# Patient Record
Sex: MALE | Race: WHITE | NOT HISPANIC OR LATINO | URBAN - METROPOLITAN AREA
[De-identification: names, ages, dates, MRNs, and addresses within clinical notes are randomized per-mention and may not be internally consistent; named-entity substitution may affect disease eponyms.]

---

## 2020-01-08 ENCOUNTER — OUTPATIENT (OUTPATIENT)
Dept: OUTPATIENT SERVICES | Facility: HOSPITAL | Age: 80
LOS: 1 days | End: 2020-01-08

## 2021-10-05 PROBLEM — Z00.00 ENCOUNTER FOR PREVENTIVE HEALTH EXAMINATION: Status: ACTIVE | Noted: 2021-10-05

## 2021-10-08 ENCOUNTER — NON-APPOINTMENT (OUTPATIENT)
Age: 81
End: 2021-10-08

## 2021-10-08 ENCOUNTER — APPOINTMENT (OUTPATIENT)
Dept: CARDIOLOGY | Facility: CLINIC | Age: 81
End: 2021-10-08
Payer: MEDICARE

## 2021-10-08 VITALS
HEIGHT: 72 IN | WEIGHT: 235 LBS | TEMPERATURE: 97 F | OXYGEN SATURATION: 97 % | DIASTOLIC BLOOD PRESSURE: 60 MMHG | HEART RATE: 95 BPM | BODY MASS INDEX: 31.83 KG/M2 | SYSTOLIC BLOOD PRESSURE: 138 MMHG

## 2021-10-08 DIAGNOSIS — Z78.9 OTHER SPECIFIED HEALTH STATUS: ICD-10-CM

## 2021-10-08 PROCEDURE — 93242 EXT ECG>48HR<7D RECORDING: CPT

## 2021-10-08 PROCEDURE — 99214 OFFICE O/P EST MOD 30 MIN: CPT

## 2021-10-08 PROCEDURE — 93000 ELECTROCARDIOGRAM COMPLETE: CPT | Mod: 59

## 2021-10-08 RX ORDER — TAMSULOSIN HYDROCHLORIDE 0.4 MG/1
0.4 CAPSULE ORAL
Refills: 0 | Status: ACTIVE | COMMUNITY

## 2021-10-08 RX ORDER — MULTIVIT-MINS/IRON/FOLIC/LYCOP 8-200-600
TABLET ORAL
Refills: 0 | Status: ACTIVE | COMMUNITY

## 2021-10-08 RX ORDER — FINASTERIDE 5 MG/1
5 TABLET, FILM COATED ORAL DAILY
Refills: 0 | Status: ACTIVE | COMMUNITY

## 2021-10-08 NOTE — ADDENDUM
[FreeTextEntry1] : Please note the patient was reviewed with the NP.\par I was physically present during the service of the patient\par I was directly involved in the management plan and recommendations of care provided to the patient. \par I personally reviewed the history and physical exam and plan as documented by the NP above.\par \par Samuel Vizcarra DO, FACC, RPVI\par Cardiologist\par 10/8/2021

## 2021-10-08 NOTE — ASSESSMENT
[FreeTextEntry1] : SUJIT JIMENEZ is a 80 year old M who presents today Oct 08, 2021 with new onset AF. \par \par 1) AF: Asymptomatic. \par Educated patient on pathophysiology of atrial fibrillation and natural progression as well as associated risk of cardioembolic event. Informed him on indications for long term anticoagulation. Currently there is no unusual bleeding on NOAC, continue current dose. Reviewed bleeding precautions. \par Rate control vs rhythm control discussed. Prefer rhythm control with RAMYA/DCCV. Discussed risks, benefits, and alternatives. Pt wishes to start with medical therapy for rate control and will come back with wife to discuss further. Increase toprol 100mg BID. 3 day monitor placed. \par \par 2) HTN: Borderline. Cont norvasc 5mg daily. Cont toprol with above changes. Low salt diet. \par \par 3) HLD: Cont pravastatin managed by PCP. \par \par Eventual stress test once rate control has been established. Will see him back next week. \par Any questions and concerns were addressed and resolved.  \par \par Sincerely,\par \par TIM Hammond\par Patients history, testing, and plan reviewed with supervising MD: Dr. Samuel Vizcarra

## 2021-10-08 NOTE — HISTORY OF PRESENT ILLNESS
[FreeTextEntry1] : 80 M with PMH of HTN, HLD here today for new onset AF. Referred by Dr. Chase PCP. \par He was at [place] for elective finger surgery. His preop EKG showed SB. Day of surgery he had AF with RVR. Surgery was deferred. Asx so sent for outpatient followup. Pt saw PCP the following day who increased beta blocker to toprol 100mg and added eliquis 5mg BID. He remains asymptomatic with RVR on EKG today. \par \par He is active in general, works on the golf course and works in the yard. Denies exertional chest pain or discomfort. Denies unusual shortness of breath, orthopnea, weight gain, or LE edema. Denies palpitations, lightheadedness, dizziness, or syncope. \par \par There is no prior history of myocardial infarction, coronary revascularization, history of ischemic heart disease, or symptomatic congestive heart failure. There is no history of cerebrovascular events. \par

## 2021-10-08 NOTE — PHYSICAL EXAM
[No Murmur] : no murmur [Soft] : abdomen soft [Normal Gait] : normal gait [No Edema] : no edema [Normal] : alert and oriented, normal memory [de-identified] : irreg irreg

## 2021-10-13 ENCOUNTER — APPOINTMENT (OUTPATIENT)
Dept: CARDIOLOGY | Facility: CLINIC | Age: 81
End: 2021-10-13
Payer: MEDICARE

## 2021-10-13 VITALS
HEART RATE: 107 BPM | SYSTOLIC BLOOD PRESSURE: 122 MMHG | WEIGHT: 239 LBS | BODY MASS INDEX: 32.37 KG/M2 | OXYGEN SATURATION: 97 % | TEMPERATURE: 97.1 F | DIASTOLIC BLOOD PRESSURE: 76 MMHG | HEIGHT: 72 IN

## 2021-10-13 PROCEDURE — 99214 OFFICE O/P EST MOD 30 MIN: CPT

## 2021-10-13 NOTE — HISTORY OF PRESENT ILLNESS
[FreeTextEntry1] : 80 M with PMH of HTN, HLD here today for new onset AF. Referred by Dr. Chase PCP. \par He was at [place] for elective finger surgery. His preop EKG showed SB. Day of surgery he had AF with RVR. Surgery was deferred. Asx so sent for outpatient followup. Pt saw PCP the following day who increased beta blocker to toprol 100mg and added eliquis 5mg BID. He remains asymptomatic with RVR on EKG. \par \par He is active in general, works on the golf course and works in the yard. Denies exertional chest pain or discomfort. Denies unusual shortness of breath, orthopnea, weight gain, or LE edema. Denies palpitations, lightheadedness, dizziness, or syncope. \par \par There is no prior history of myocardial infarction, coronary revascularization, history of ischemic heart disease, or symptomatic congestive heart failure. There is no history of cerebrovascular events. \par \par His initial consult was last week, his toprol was doubled to 100mg BID. He remains in -110s on exam in office today but remains asymptomatic as above. He is reluctant to pursue DCCV. He has no bleeding complications on AC. \par Labs were requested from PCP office to check TSH. He endorses rare snoring episodes, wife denies apneic eps. He rarely drink 1-2 drinks of ETOH. He has one cup of coffee in the morning. \par \par 3 day monitor results are not yet available. \par

## 2021-10-13 NOTE — ASSESSMENT
[FreeTextEntry1] : SUJIT JIMENEZ is a 80 year old M who presents today Oct 13, 2021 with new onset AF for close followup of rate control. \par \par 1) AF: Asymptomatic. \par Educated patient on pathophysiology of atrial fibrillation and natural progression as well as associated risk of cardioembolic event. Informed him on indications for long term anticoagulation. Currently there is no unusual bleeding on NOAC, continue current dose. Reviewed bleeding precautions. \par Rate control vs rhythm control discussed. Prefer rhythm control with RAMYA/DCCV. Discussed risks, benefits, and alternatives. Encouraged early rhythm control strategy, pt declines. Pt wishes to cont with medical therapy, toprol 100mg BID. 3 day monitor results not yet available but remains tachycardic today. \par - Will obtain echo to r/o structural abnormalities and assess LVEF. Consider change norvasc to diltiazem if stable findings seeing as his BP can tolerate. \par - Discussed need to r/o underlying etiologies of AF, requested labs for TSH from PCP office. Recommend a sleep study to r/o sleep apnea w/ PCP. Reduced caffeine and ETOH discussed. Weight loss. \par - If any unstable symptoms, chest pain >10 mins, dizziness, dyspnea at rest recommend emergent medical attn. \par \par 2) HTN: Improved. Cont norvasc 5mg daily, toprol 100mg BID.  Low salt diet. \par \par 3) HLD: Cont pravastatin managed by PCP. \par \par I'll call with monitor/echo results if med changes are warranted. \par Eventual stress test once rate control has been established. Close clinical followup is planned. \par Any questions and concerns by pt /wife were addressed and resolved.  \par \par Sincerely,\par \par TIM Hammond\par Patients history, testing, and plan reviewed with supervising MD: Dr. Claudio Dash

## 2021-10-13 NOTE — PHYSICAL EXAM
[No Murmur] : no murmur [Soft] : abdomen soft [Normal Gait] : normal gait [No Edema] : no edema [Normal] : alert and oriented, normal memory [de-identified] : irreg irreg

## 2021-10-20 ENCOUNTER — APPOINTMENT (OUTPATIENT)
Dept: CARDIOLOGY | Facility: CLINIC | Age: 81
End: 2021-10-20
Payer: MEDICARE

## 2021-10-20 PROCEDURE — 93306 TTE W/DOPPLER COMPLETE: CPT

## 2021-10-20 RX ORDER — AMLODIPINE BESYLATE 5 MG/1
5 TABLET ORAL DAILY
Refills: 0 | Status: DISCONTINUED | COMMUNITY
End: 2021-10-20

## 2021-10-22 PROCEDURE — 93244 EXT ECG>48HR<7D REV&INTERPJ: CPT

## 2021-10-28 ENCOUNTER — APPOINTMENT (OUTPATIENT)
Dept: CARDIOLOGY | Facility: CLINIC | Age: 81
End: 2021-10-28

## 2021-10-29 ENCOUNTER — APPOINTMENT (OUTPATIENT)
Dept: CARDIOLOGY | Facility: CLINIC | Age: 81
End: 2021-10-29
Payer: MEDICARE

## 2021-10-29 VITALS
HEART RATE: 75 BPM | HEIGHT: 72 IN | TEMPERATURE: 97.1 F | BODY MASS INDEX: 31.97 KG/M2 | OXYGEN SATURATION: 97 % | SYSTOLIC BLOOD PRESSURE: 130 MMHG | DIASTOLIC BLOOD PRESSURE: 68 MMHG | WEIGHT: 236 LBS

## 2021-10-29 PROCEDURE — 99215 OFFICE O/P EST HI 40 MIN: CPT

## 2021-11-01 NOTE — ASSESSMENT
[FreeTextEntry1] : SUJIT JIMENEZ  is a 80 year old  M\par rapid atrial fibrillation sx\par recommend rhythm control strategy for symptomatic atrial fibrillation \par plan for initial RAMYA guided cardioversion and subsequent electrophysiology evaluation regarding long-term management\par follow-up stress test due to ventricular ectopy versus aberrancy NSVT \par \par Educated patient on pathophysiology of atrial fibrillation and natural progression as well as associated risk of cardioembolic event. \par Informed him on indications for long term anticoagulation. Currently there is no unusual bleeding on NOAC, continue current dose. Reviewed bleeding precautions. \par \par HTN:  Low salt diet. \par HLD:  statin\par \par close clinical followup is planned. \par Any questions and concerns by pt /wife were addressed and resolved.  \par

## 2021-11-01 NOTE — HISTORY OF PRESENT ILLNESS
[FreeTextEntry1] : 80 M with PMH of HTN, HLD \par \par He was at [place] for elective finger surgery. \par initial EKG had sinus bradycardia \par Day of surgery he had AF with RVR. Surgery was deferred. \par subsequent EKG atrial fibrillation with rapid ventricular rate\par Pt saw PCP the following day who increased beta blocker to toprol 100mg and added eliquis 5mg BID. \par previously was on amlodipine losartan and metoprolol \par EKG in our office demonstrates rapid A. fib \par echocardiogram demonstrates normal left ventricular function mild mitral regurgitation mild to moderate aortic regurgitation mild ascending aortic dilatation mild pulmonary hypertension\par at times rapid ventricular rates \par amlodipine was changed to diltiazem\par \par There is no prior history of a clinical myocardial infarction, coronary revascularization. \par \par there is dyspnea on exertion and reduced exercise tolerance\par there is no shortness of breath at rest or orthopnea \par there is also new onset of peripheral edema \par There is no exertional chest pain, pressure or discomfort. \par There are no symptomatic palpitations, lightheadedness, dizziness or syncope.\par baseline active, works on the [employer] course and works in the yard.\par He has no bleeding complications on AC. \par \par Blood work September 2021 hemoglobin 15.7 potassium 4.1 creatinine 1.1 \par follow-up blood work with potassium 4.7 magnesium 2.1 TSH 0.7 \par monitor demonstrates atrial fibrillation with an average rate of 98 \par \par

## 2021-11-13 ENCOUNTER — APPOINTMENT (OUTPATIENT)
Dept: DISASTER EMERGENCY | Facility: CLINIC | Age: 81
End: 2021-11-13

## 2021-11-14 LAB — SARS-COV-2 N GENE NPH QL NAA+PROBE: NOT DETECTED

## 2021-11-17 ENCOUNTER — OUTPATIENT (OUTPATIENT)
Dept: INPATIENT UNIT | Facility: HOSPITAL | Age: 81
LOS: 1 days | End: 2021-11-17
Payer: MEDICARE

## 2021-11-17 PROCEDURE — 93010 ELECTROCARDIOGRAM REPORT: CPT | Mod: 59

## 2021-11-17 PROCEDURE — 92960 CARDIOVERSION ELECTRIC EXT: CPT

## 2021-11-17 PROCEDURE — 93312 ECHO TRANSESOPHAGEAL: CPT | Mod: 26

## 2021-11-23 ENCOUNTER — NON-APPOINTMENT (OUTPATIENT)
Age: 81
End: 2021-11-23

## 2021-11-23 ENCOUNTER — APPOINTMENT (OUTPATIENT)
Dept: CARDIOLOGY | Facility: CLINIC | Age: 81
End: 2021-11-23
Payer: MEDICARE

## 2021-11-23 VITALS
OXYGEN SATURATION: 98 % | HEART RATE: 63 BPM | WEIGHT: 230 LBS | DIASTOLIC BLOOD PRESSURE: 64 MMHG | TEMPERATURE: 98.2 F | BODY MASS INDEX: 31.15 KG/M2 | SYSTOLIC BLOOD PRESSURE: 140 MMHG | HEIGHT: 72 IN

## 2021-11-23 DIAGNOSIS — Z01.818 ENCOUNTER FOR OTHER PREPROCEDURAL EXAMINATION: ICD-10-CM

## 2021-11-23 DIAGNOSIS — Z87.09 PERSONAL HISTORY OF OTHER DISEASES OF THE RESPIRATORY SYSTEM: ICD-10-CM

## 2021-11-23 PROCEDURE — 93000 ELECTROCARDIOGRAM COMPLETE: CPT

## 2021-11-23 PROCEDURE — 99214 OFFICE O/P EST MOD 30 MIN: CPT

## 2021-11-23 RX ORDER — METOPROLOL SUCCINATE 100 MG/1
100 TABLET, EXTENDED RELEASE ORAL TWICE DAILY
Qty: 180 | Refills: 3 | Status: DISCONTINUED | COMMUNITY
End: 2021-11-23

## 2021-11-23 NOTE — HISTORY OF PRESENT ILLNESS
[FreeTextEntry1] : SUJIT JIMENEZ  is a 80 year old  M\par PMH of HTN, HLD, AFib\par \par He was at [place] for elective finger surgery. \par Day of surgery he had AF with RVR. Surgery was deferred. \par subsequent EKG atrial fibrillation with rapid ventricular rate\par Pt saw PCP the following day who increased beta blocker to toprol 100mg and added eliquis 5mg BID. \par previously was on amlodipine losartan and metoprolol \par EKG in our office demonstrates rapid A. fib \par \par at times rapid ventricular rates \par amlodipine was changed to diltiazem\par \par There is no prior history of a clinical myocardial infarction, coronary revascularization. \par \par He is active with golf and yard work. \par He has no bleeding complications on AC. \par There is no exertional chest pain, pressure or discomfort. \par There are no symptomatic palpitations, lightheadedness, dizziness or syncope.\par He reports improvement in symptoms p DCCV\par \par Blood work September 2021 hemoglobin 15.7 potassium 4.1 creatinine 1.1 \par follow-up blood work with potassium 4.7 magnesium 2.1 TSH 0.7 \par monitor demonstrates atrial fibrillation with an average rate of 98 \par Transesophageal echo has normal left ventricular function mild to moderate aortic and mitral regurgitation aortic atherosclerosis no thrombus \par direct-current cardioversion November 2021 restoration to normal sinus rhythm \par EKG demonstrates sinus rhythm with poor R wave progression \par echocardiogram demonstrates normal left ventricular function mild mitral regurgitation mild to moderate aortic regurgitation mild ascending aortic dilatation mild pulmonary hypertension

## 2021-11-23 NOTE — ASSESSMENT
[FreeTextEntry1] : HTN, AF, HL\par rapid atrial fibrillation sx s/ DCCV\par follow-up will be scheduled with electrophysiology regarding long-term rhythm control strategy\par \par Educated patient on pathophysiology of atrial fibrillation and natural progression as well as associated risk of cardioembolic event. \par Informed him on indications for long term anticoagulation. Currently there is no unusual bleeding on NOAC, continue current dose. Reviewed bleeding precautions. \par Continue statin therapy.\par \par HTN:  Low salt diet. \par HLD:  statin\par \par RAMYA reviewed \par normal left ventricular function. normal sinus rhythm restored \par \par EKG and echo reviewed\par normal sinus rhythm with poor R wave progression\par mild to moderate aortic regurgitation, mild ascending aortic dilatation, mild pulmonary hypertension, mild MR\par Instructed to follow-up will be scheduled with electrophysiology regarding long-term rhythm control strategy\par \par Chemical stress test requested to assess ischemia h/o NSVT multiple CRF etc\par \par F/u after test results \par \par Discussed red flag symptoms that would warrant immediate intervention. All patient questions and concerns have been addressed Instructed to call the office if any new symptoms.\par \par Encounter documented by Alena Justice on 11/23/2021  acting as a scribe for Dr. Lobito Nichols MD [facility]\par

## 2021-12-01 ENCOUNTER — APPOINTMENT (OUTPATIENT)
Dept: ELECTROPHYSIOLOGY | Facility: CLINIC | Age: 81
End: 2021-12-01
Payer: MEDICARE

## 2021-12-01 ENCOUNTER — NON-APPOINTMENT (OUTPATIENT)
Age: 81
End: 2021-12-01

## 2021-12-01 VITALS
BODY MASS INDEX: 31.42 KG/M2 | HEART RATE: 63 BPM | HEIGHT: 72 IN | OXYGEN SATURATION: 96 % | WEIGHT: 232 LBS | TEMPERATURE: 98 F | SYSTOLIC BLOOD PRESSURE: 148 MMHG | DIASTOLIC BLOOD PRESSURE: 60 MMHG

## 2021-12-01 PROCEDURE — 93000 ELECTROCARDIOGRAM COMPLETE: CPT

## 2021-12-01 PROCEDURE — 99204 OFFICE O/P NEW MOD 45 MIN: CPT

## 2021-12-01 NOTE — HISTORY OF PRESENT ILLNESS
[FreeTextEntry1] : Patient is an 80-year-old man who is seen in  evaluation for his atrial fibrillation.\par \par He has a prior history of hypertension and dyslipidemia.\par \par The patient was scheduled to have elective finger surgery  Nov 2021 at [facility] [place]  and on the day of surgery was noted to be in atrial fibrillation with increased ventricular response.  At that time he had no symptoms he had driven to the hospital and was feeling well without any awareness of atrial fibrillation.  He was seen by Dr. Gt Garcia and started on Toprol- mg/day and Eliquis 5 mg twice daily.  He was previously taking losartan, amlodipine and metoprolol for his blood pressure.  He subsequently saw Dr. Lobito Nichols and the patient underwent a RAMYA cardioversion.  In follow-up he remained in sinus rhythm.  The patient was not aware of A. fib but in retrospect after the cardioversion he is felt better with more energy.  He continues to feel well currently.\par \par The patient has been active until recently.  He walks the golf course currently without any symptoms.\par He had an echocardiogram performed on 10/20/2021 that showed EF 60%, left atrial diameter 4.9 cm, left atrial volume index 25 cc/m².  This question of atrial septal aneurysm with a  small PFO.  There is no pericardial effusion.  The septal thickness is 1.0 cm and posterior wall thickness 1.1 cm.\par \par A previous monitor from 10/8/2021 for 2 days had shown paroxysmal A. fib and 4 beats of an irregular tachycardia that appeared to be ventricular.\par Patient reports that his creatinine was previously elevated and he is following up with a nephrologist.

## 2021-12-01 NOTE — PHYSICAL EXAM
[No Acute Distress] : no acute distress [Normal Conjunctiva] : normal conjunctiva [Normal Venous Pressure] : normal venous pressure [No Carotid Bruit] : no carotid bruit [Normal S1, S2] : normal S1, S2 [Clear Lung Fields] : clear lung fields [Non Tender] : non-tender [No Focal Deficits] : no focal deficits [Alert and Oriented] : alert and oriented

## 2021-12-01 NOTE — REVIEW OF SYSTEMS
[Fever] : no fever [Weight Loss (___ Lbs)] : no recent weight loss [Blurry Vision] : no blurred vision [Sore Throat] : no sore throat [SOB] : no shortness of breath [Dyspnea on exertion] : not dyspnea during exertion [Chest Discomfort] : no chest discomfort [Leg Claudication] : no intermittent leg claudication [Palpitations] : no palpitations [Orthopnea] : no orthopnea [Cough] : no cough [Abdominal Pain] : no abdominal pain [Hematuria] : no hematuria [Joint Pain] : no joint pain [Rash] : no rash [Dizziness] : no dizziness [Under Stress] : not under stress [Easy Bleeding] : no tendency for easy bleeding [FreeTextEntry8] : elevated Cr

## 2021-12-01 NOTE — DISCUSSION/SUMMARY
[FreeTextEntry1] : The patient is previous paroxysmal A. fib noted in October and then persistent atrial fibrillation that required cardioversion.  When he was in A. fib he was not aware of it but now he feels the difference in sinus rhythm with much higher energy level.\par \par The triggers for his A. fib  include age as well as history of high blood pressure.  I agree with the management currently of beta-blocker therapy.  This may help to prevent future atrial fibrillation.  We will monitor his heart rate and if we should notice any significant bradycardia would recommend switching from diltiazem to Norvasc.\par \par The patient should continue on anticoagulation with Eliquis.\par \par \par \par In terms of risk factors for atrial fibrillation and modification: He has increased BMI recommend weight loss.  He denies any history of sleep apnea.  The patient occasionally snores according to spouse.  It may be reasonable for him to get a sleep study to rule out sleep apnea.  He  drinks alcohol socially small amount.\par \par We should do follow-up monitoring and if he has recurrent atrial fibrillation we could then consider catheter ablation versus antiarrhythmic therapy.\par  Regarding NSVT: 3-4 beats. Nl LV function. On beta blocker. Await stress test. \par We could do a random Zio patch monitor or consider an implantable loop monitor for follow-up monitoring.\par \par Plans\par He will decide on ILR vs External Monitoring\par Sleep Study ( wants to discuss with Dr. Chase first)\par Weight loss\par Stress test pending. \par Continue on Beta blocker and Eliquis\par Follow up with EP if recurrent AF\par

## 2021-12-09 ENCOUNTER — APPOINTMENT (OUTPATIENT)
Dept: CARDIOLOGY | Facility: CLINIC | Age: 81
End: 2021-12-09
Payer: MEDICARE

## 2021-12-09 PROCEDURE — A9502: CPT

## 2021-12-09 PROCEDURE — 78452 HT MUSCLE IMAGE SPECT MULT: CPT

## 2021-12-09 PROCEDURE — 93015 CV STRESS TEST SUPVJ I&R: CPT

## 2022-01-07 ENCOUNTER — APPOINTMENT (OUTPATIENT)
Dept: CARDIOLOGY | Facility: CLINIC | Age: 82
End: 2022-01-07

## 2022-01-14 ENCOUNTER — APPOINTMENT (OUTPATIENT)
Dept: CARDIOLOGY | Facility: CLINIC | Age: 82
End: 2022-01-14
Payer: MEDICARE

## 2022-01-14 ENCOUNTER — NON-APPOINTMENT (OUTPATIENT)
Age: 82
End: 2022-01-14

## 2022-01-14 VITALS
TEMPERATURE: 97.3 F | SYSTOLIC BLOOD PRESSURE: 114 MMHG | HEIGHT: 72 IN | DIASTOLIC BLOOD PRESSURE: 58 MMHG | BODY MASS INDEX: 31.83 KG/M2 | HEART RATE: 106 BPM | OXYGEN SATURATION: 97 % | WEIGHT: 235 LBS

## 2022-01-14 PROCEDURE — 99214 OFFICE O/P EST MOD 30 MIN: CPT

## 2022-01-14 PROCEDURE — 93000 ELECTROCARDIOGRAM COMPLETE: CPT

## 2022-01-14 PROCEDURE — 93242 EXT ECG>48HR<7D RECORDING: CPT

## 2022-01-14 NOTE — HISTORY OF PRESENT ILLNESS
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 \par \par Initially at [place] for elective finger surgery. \par Day of surgery had AF with RVR. Surgery was deferred. \par subsequent EKG atrial fibrillation with rapid ventricular rate\par Pt saw PCP the following day who increased beta blocker to toprol 100mg and added eliquis 5mg BID. \par previously was on amlodipine losartan and metoprolol \par EKG in our office demonstrates rapid A. fib \par amlodipine was changed to diltiazem\par \par improvement in symptoms s/p DCCV\par \par He is active with golf and yard work. \par no bleeding complications on AC. \par \par There is no exertional chest pain, pressure or discomfort. \par There are no symptomatic palpitations, lightheadedness, dizziness or syncope.\par \par Last EKG demonstrated sinus rhythm today's EKG demonstrates recurrent atrial fibrillation possible anteroseptal MI \par recent nuclear stress test December 2021 inferoapical defect with concomitant attenuation normal left ventricular function and no ischemia \par Blood work September 2021 hemoglobin 15.7 potassium 4.1 creatinine 1.1 \par monitor demonstrates atrial fibrillation with an average rate of 98 \par Transesophageal echo has normal left ventricular function mild to moderate aortic and mitral regurgitation aortic atherosclerosis no thrombus \par direct-current cardioversion November 2021 restoration to normal sinus rhythm \par echocardiogram demonstrates normal left ventricular function mild mitral regurgitation mild to moderate aortic regurgitation mild ascending aortic dilatation mild pulmonary hypertension\par

## 2022-01-14 NOTE — ASSESSMENT
[FreeTextEntry1] : \par rapid atrial fibrillation sx s/p DCCV\par recurrent atrial fibrillation \par monitor applied \par improvement in rate control \par refilled anticoagulation \par electrophysiology follow-up regarding long-term rhythm control strategy\par reviewed bleeding precautions. \par \par HTN:  Low salt diet. \par HLD:  statin\par \par mild to moderate aortic regurgitation, mild ascending aortic dilatation, mild pulmonary hypertension, mild MR\par \par Discussed red flag symptoms that would warrant immediate intervention. All patient questions and concerns have been addressed Instructed to call the office if any new symptoms.

## 2022-01-25 PROCEDURE — 93244 EXT ECG>48HR<7D REV&INTERPJ: CPT

## 2022-02-02 ENCOUNTER — APPOINTMENT (OUTPATIENT)
Dept: ELECTROPHYSIOLOGY | Facility: CLINIC | Age: 82
End: 2022-02-02
Payer: MEDICARE

## 2022-02-02 VITALS
TEMPERATURE: 97.7 F | DIASTOLIC BLOOD PRESSURE: 62 MMHG | OXYGEN SATURATION: 97 % | HEIGHT: 72 IN | BODY MASS INDEX: 32.23 KG/M2 | WEIGHT: 238 LBS | HEART RATE: 68 BPM | SYSTOLIC BLOOD PRESSURE: 148 MMHG

## 2022-02-02 PROCEDURE — 99214 OFFICE O/P EST MOD 30 MIN: CPT

## 2022-02-17 NOTE — HISTORY OF PRESENT ILLNESS
[FreeTextEntry1] : Patient seen in follow-up evaluation because of recurrent atrial fibrillation.  He is not symptomatic from the A. fib.  He denies any symptoms of shortness of breath, chest pain, dizziness, lightness, syncope or presyncope.  He had COVID 19 infection end of December 2021. A. fib recurred after Covid and he feels its related.\par \par Patient is currently on Eliquis 5 mg twice daily.  In addition he is rate controlled with diltiazem 120 mg daily and metoprolol 100 mg daily.\par \par \par He has a prior history of hypertension and dyslipidemia.\par \par Previous history: The patient [place] [facility] [place] on the day of surgery was noted to be in atrial fibrillation with increased ventricular response.  At that time he had no symptoms he had driven to the hospital and was feeling well without any awareness of atrial fibrillation.  He was seen by Dr. Gt Garcia and started on Toprol- mg/day and Eliquis 5 mg twice daily.  He was previously taking losartan, amlodipine and metoprolol for his blood pressure.  He subsequently saw Dr. Lobito Nichols and the patient underwent a RAMYA cardioversion.   The patient was not aware of A. fib but in retrospect after the cardioversion he is felt better with more energy.  \par The patient has been active.\par He had an echocardiogram performed on 10/20/2021 that showed EF 60%, left atrial diameter 4.9 cm, left atrial volume index 25 cc/m².  This question of atrial septal aneurysm with a  small PFO.  There is no pericardial effusion.  The septal thickness is 1.0 cm and posterior wall thickness 1.1 cm.\par \par A previous monitor from 10/8/2021 for 2 days had shown paroxysmal A. fib and 4 beats of an irregular tachycardia that appeared to be ventricular.\par Patient reports that his creatinine was previously elevated and he is following up with a nephrologist.

## 2022-02-17 NOTE — DISCUSSION/SUMMARY
[FreeTextEntry1] : He has recurrent atrial fibrillation that occurred after his Covid infection.  Question whether Covid infection serves as a trigger.  Discussed the treatment options and felt that it would be reasonable to restore sinus rhythm.  He did feel better after his prior cardioversion.  We discussed the possibility that the A. fib recurs.  We discussed antiarrhythmics versus catheter ablation.  He would prefer not to take further medications.  Patient would prefer to try cardioversion.  He was scheduled for electrical cardioversion and monitor how he feels afterwards.  Should he have recurrent A. fib we would then consider catheter ablation of A. fib.   I told him they might be like high likelihood of recurrent A. fib given the fact that he has had prior A. fib.\par His anticoagulation has been noninterrupted and will schedule him for cardioversion without RAMYA.\par \par The patient should continue on anticoagulation with Eliquis.\par \par \par \par

## 2022-02-23 ENCOUNTER — OUTPATIENT (OUTPATIENT)
Dept: OUTPATIENT SERVICES | Facility: HOSPITAL | Age: 82
LOS: 1 days | Discharge: HOME | End: 2022-02-23
Payer: MEDICARE

## 2022-02-23 PROCEDURE — 92960 CARDIOVERSION ELECTRIC EXT: CPT

## 2022-02-28 DIAGNOSIS — I10 ESSENTIAL (PRIMARY) HYPERTENSION: ICD-10-CM

## 2022-02-28 DIAGNOSIS — Z86.16 PERSONAL HISTORY OF COVID-19: ICD-10-CM

## 2022-02-28 DIAGNOSIS — I48.91 UNSPECIFIED ATRIAL FIBRILLATION: ICD-10-CM

## 2022-02-28 DIAGNOSIS — E78.5 HYPERLIPIDEMIA, UNSPECIFIED: ICD-10-CM

## 2022-03-07 ENCOUNTER — APPOINTMENT (OUTPATIENT)
Dept: ELECTROPHYSIOLOGY | Facility: CLINIC | Age: 82
End: 2022-03-07
Payer: MEDICARE

## 2022-03-07 ENCOUNTER — NON-APPOINTMENT (OUTPATIENT)
Age: 82
End: 2022-03-07

## 2022-03-07 VITALS
TEMPERATURE: 97.3 F | HEART RATE: 63 BPM | SYSTOLIC BLOOD PRESSURE: 156 MMHG | OXYGEN SATURATION: 97 % | DIASTOLIC BLOOD PRESSURE: 64 MMHG

## 2022-03-07 VITALS — WEIGHT: 238 LBS | BODY MASS INDEX: 32.23 KG/M2 | HEIGHT: 72 IN

## 2022-03-07 PROCEDURE — 99214 OFFICE O/P EST MOD 30 MIN: CPT

## 2022-03-07 PROCEDURE — 93000 ELECTROCARDIOGRAM COMPLETE: CPT

## 2022-03-11 ENCOUNTER — NON-APPOINTMENT (OUTPATIENT)
Age: 82
End: 2022-03-11

## 2022-03-14 RX ORDER — PRAVASTATIN SODIUM 80 MG/1
80 TABLET ORAL DAILY
Qty: 30 | Refills: 0 | Status: ACTIVE | COMMUNITY

## 2022-03-15 NOTE — PHYSICAL EXAM
[No Acute Distress] : no acute distress [Normal Conjunctiva] : normal conjunctiva [Normal Venous Pressure] : normal venous pressure [No Carotid Bruit] : no carotid bruit [Normal S1, S2] : normal S1, S2 [Clear Lung Fields] : clear lung fields [Non Tender] : non-tender [No Focal Deficits] : no focal deficits [Alert and Oriented] : alert and oriented [de-identified] : Regular rhythm

## 2022-03-15 NOTE — DISCUSSION/SUMMARY
[FreeTextEntry1] : He is remained in sinus rhythm status post recent electrical cardioversion.  Is felt that his recent AF was triggered by receiving Covid infection.\par Risk factors include age and hypertension.  Echocardiogram showed left atrial diameter 4.1 cm with left atrial volume index 25 cc/m².\par He is on diltiazem as well as metoprolol and we will monitor for bradycardia.\par \par If recurrent atrial fibrillation we discussed the option of antiarrhythmic versus catheter ablation.  Patient is not sure he wants a catheter ablation procedure.  He states he has many friends he has had it done recurrences.  We will rediscuss if AF reemerges.\par \par The patient should continue on anticoagulation with Eliquis.  Follow-up monitoring to assess for bradycardia as well as asymptomatic A. fib.\par \par Follow-up electrophysiology in 6 months.\par \par Follow-up with cardiology.\par \par \par \par

## 2022-03-15 NOTE — HISTORY OF PRESENT ILLNESS
[FreeTextEntry1] : Patient is an 81-year-old man who is seen in follow-up evaluation status post recent electrical cardioversion.  Post procedure he is feeling well and is feeling better in sinus rhythm.  \par \par Patient is currently on Eliquis 5 mg twice daily.  In addition he is rate controlled with diltiazem 120 mg daily and metoprolol 100 mg daily.\par \par \par He has a prior history of hypertension and dyslipidemia.\par \par Previous history: The patient was scheduled to have elective finger surgery  Nov 2021 at [facility] [place]  and on the day of surgery was noted to be in atrial fibrillation with increased ventricular response.  At that time he had no symptoms he had driven to the hospital and was feeling well without any awareness of atrial fibrillation.  He was seen by Dr. Gt Garcia and started on Toprol- mg/day and Eliquis 5 mg twice daily.  He was previously taking losartan, amlodipine and metoprolol for his blood pressure.  He subsequently saw Dr. Lobito Nichols and the patient underwent a RAMYA cardioversion.   The patient was not aware of A. fib but in retrospect after the cardioversion he is felt better with more energy.  \par The patient has been active.\par He had an echocardiogram performed on 10/20/2021 that showed EF 60%, left atrial diameter 4.9 cm, left atrial volume index 25 cc/m².  This question of atrial septal aneurysm with a  small PFO.  There is no pericardial effusion.  The septal thickness is 1.0 cm and posterior wall thickness 1.1 cm.\par \par A previous monitor from 10/8/2021 for 2 days had shown paroxysmal A. fib and 4 beats of an irregular tachycardia that appeared to be ventricular.\par Patient reports that his creatinine was previously elevated and he is following up with a nephrologist.

## 2022-03-17 ENCOUNTER — NON-APPOINTMENT (OUTPATIENT)
Age: 82
End: 2022-03-17

## 2022-07-14 ENCOUNTER — NON-APPOINTMENT (OUTPATIENT)
Age: 82
End: 2022-07-14

## 2022-08-15 ENCOUNTER — NON-APPOINTMENT (OUTPATIENT)
Age: 82
End: 2022-08-15

## 2022-08-15 ENCOUNTER — APPOINTMENT (OUTPATIENT)
Dept: ELECTROPHYSIOLOGY | Facility: CLINIC | Age: 82
End: 2022-08-15

## 2022-08-15 VITALS
HEIGHT: 72 IN | TEMPERATURE: 97.1 F | OXYGEN SATURATION: 96 % | BODY MASS INDEX: 31.42 KG/M2 | HEART RATE: 66 BPM | SYSTOLIC BLOOD PRESSURE: 138 MMHG | WEIGHT: 232 LBS | DIASTOLIC BLOOD PRESSURE: 68 MMHG

## 2022-08-15 PROCEDURE — 93000 ELECTROCARDIOGRAM COMPLETE: CPT

## 2022-08-15 PROCEDURE — 99214 OFFICE O/P EST MOD 30 MIN: CPT | Mod: 25

## 2022-08-29 NOTE — HISTORY OF PRESENT ILLNESS
[FreeTextEntry1] : PCP: Gt Chase MD\par Cardiologist: Lobito Nichols MD\par The patient is an 81-year-old man who is seen in follow-up evaluation for his atrial fibrillation.  He was referred by  Dr. Gt Chase  because of recurrent A. fib.\par \par He has had prior A. fib and underwent previous electrical cardioversions.  After the cardioversion procedures he has done well for a short period of time before he had recurrence of A. fib.  During a time that he is was in sinus rhythm the patient felt much better with more energy.  Recently he does feel tired and fatigued when he pushes himself. \par His last echocardiogram was October 2021 EF 60%, mild mitral regurgitation, mild to moderate aortic regurgitation, mildly dilated left atrium with left atrial volume index 35 cc per metered square.  Atrial septal aneurysm with small PFO.  Diastolic dysfunction no pericardial effusion.  Left atrial diameter 4.9 cm left atrial volume index 35 cc per metered square.\par \par \par Patient is currently on Eliquis 5 mg twice daily.  In addition he is rate controlled with diltiazem 120 mg daily and metoprolol 100 mg daily.\par \par \par He has a prior history of hypertension and dyslipidemia.\par \par Previous history: The patient was scheduled to have elective finger surgery  Nov 2021 at [facility] [place]  and on the day of surgery was noted to be in atrial fibrillation with increased ventricular response.  At that time he had no symptoms he had driven to the hospital and was feeling well without any awareness of atrial fibrillation.  He was seen by Dr. Gt Garcia and started on Toprol- mg/day and Eliquis 5 mg twice daily.  He was previously taking losartan, amlodipine and metoprolol for his blood pressure.  He subsequently saw Dr. Lobito Nichols and the patient underwent a RAMYA cardioversion.   The patient was not aware of A. fib but in retrospect after the cardioversion he is felt better with more energy.  \par The patient has been active.\par He had an echocardiogram performed on 10/20/2021 that showed EF 60%, left atrial diameter 4.9 cm, left atrial volume index 25 cc/m².  This question of atrial septal aneurysm with a  small PFO.  There is no pericardial effusion.  The septal thickness is 1.0 cm and posterior wall thickness 1.1 cm.\par \par A previous monitor from 10/8/2021 for 2 days had shown paroxysmal A. fib and 4 beats of an irregular tachycardia that appeared to be ventricular.\par Patient reports that his creatinine was previously elevated and he is following up with a nephrologist.

## 2022-08-29 NOTE — DISCUSSION/SUMMARY
[FreeTextEntry1] : The patient has recurrent atrial fibrillation it appears to be rate controlled.  He seems to have symptoms from A. fib in the way of fatigue.  The symptoms become more evident when he pushes himself.\par \par He has been on anticoagulation with Eliquis 5 mg twice daily has not had any issues there.\par \par He is on metoprolol succinate  mg daily as well as diltiazem  mg daily.  His blood pressure is in the range of 138/68.\par \par The patient risk factors include age, history of hypertension.\par \par I would recommend restoration of sinus rhythm in this patient since he felt better when he was in normal rhythm.  The options the patient has is either an antiarrhythmic drug plus cardioversion or catheter ablation.  \par His alcohol consumption at times is moderate.\par \par Regarding sleep apnea the patient does not think he has it.\par \par We will arrange for the patient have a sleep study at home.\par \par I discussed the option him and he is leaning towards having the ablation procedure done.\par \par The AF ablation procedure, risk, benefits and alternatives fully discussed\par Success rate, recurrence rate, redo rates all discussed\par Complications including perforation, vascular/valvular injuries, lung injury, esophageal injury, bleeding requiring transfusion, mortality discussed. \par The patient  understands and wants to proceed.\par

## 2022-08-29 NOTE — PHYSICAL EXAM
[No Acute Distress] : no acute distress [Normal Conjunctiva] : normal conjunctiva [Normal Venous Pressure] : normal venous pressure [No Carotid Bruit] : no carotid bruit [Normal S1, S2] : normal S1, S2 [Clear Lung Fields] : clear lung fields [Non Tender] : non-tender [No Focal Deficits] : no focal deficits [Alert and Oriented] : alert and oriented [de-identified] : Regular rhythm

## 2022-09-02 ENCOUNTER — OUTPATIENT (OUTPATIENT)
Dept: OUTPATIENT SERVICES | Facility: HOSPITAL | Age: 82
LOS: 1 days | End: 2022-09-02
Payer: MEDICARE

## 2022-09-02 DIAGNOSIS — G47.33 OBSTRUCTIVE SLEEP APNEA (ADULT) (PEDIATRIC): ICD-10-CM

## 2022-09-02 PROCEDURE — G0399: CPT | Mod: 26

## 2022-09-02 PROCEDURE — 95806 SLEEP STUDY UNATT&RESP EFFT: CPT

## 2022-09-20 ENCOUNTER — NON-APPOINTMENT (OUTPATIENT)
Age: 82
End: 2022-09-20

## 2022-09-21 ENCOUNTER — FORM ENCOUNTER (OUTPATIENT)
Age: 82
End: 2022-09-21

## 2022-09-22 ENCOUNTER — INPATIENT (INPATIENT)
Facility: HOSPITAL | Age: 82
LOS: 0 days | Discharge: HOME | DRG: 274 | End: 2022-09-23
Attending: INTERNAL MEDICINE | Admitting: INTERNAL MEDICINE
Payer: MEDICARE

## 2022-09-22 ENCOUNTER — TRANSCRIPTION ENCOUNTER (OUTPATIENT)
Age: 82
End: 2022-09-22

## 2022-09-22 VITALS
HEIGHT: 71 IN | OXYGEN SATURATION: 98 % | RESPIRATION RATE: 18 BRPM | HEART RATE: 109 BPM | SYSTOLIC BLOOD PRESSURE: 146 MMHG | WEIGHT: 229.94 LBS | DIASTOLIC BLOOD PRESSURE: 93 MMHG

## 2022-09-22 DIAGNOSIS — I48.91 UNSPECIFIED ATRIAL FIBRILLATION: ICD-10-CM

## 2022-09-22 DIAGNOSIS — Z98.890 OTHER SPECIFIED POSTPROCEDURAL STATES: Chronic | ICD-10-CM

## 2022-09-22 LAB
ABO RH CONFIRMATION: SIGNIFICANT CHANGE UP
ANION GAP SERPL CALC-SCNC: 11 MMOL/L — SIGNIFICANT CHANGE UP (ref 5–17)
APTT BLD: 36.8 SEC — HIGH (ref 27.5–35.5)
BLD GP AB SCN SERPL QL: SIGNIFICANT CHANGE UP
BUN SERPL-MCNC: 21.6 MG/DL — HIGH (ref 8–20)
CALCIUM SERPL-MCNC: 9.3 MG/DL — SIGNIFICANT CHANGE UP (ref 8.4–10.5)
CHLORIDE SERPL-SCNC: 102 MMOL/L — SIGNIFICANT CHANGE UP (ref 98–107)
CO2 SERPL-SCNC: 26 MMOL/L — SIGNIFICANT CHANGE UP (ref 22–29)
CREAT SERPL-MCNC: 1.2 MG/DL — SIGNIFICANT CHANGE UP (ref 0.5–1.3)
EGFR: 60 ML/MIN/1.73M2 — SIGNIFICANT CHANGE UP
GLUCOSE SERPL-MCNC: 113 MG/DL — HIGH (ref 70–99)
HCT VFR BLD CALC: 49.9 % — SIGNIFICANT CHANGE UP (ref 39–50)
HGB BLD-MCNC: 16.4 G/DL — SIGNIFICANT CHANGE UP (ref 13–17)
INR BLD: 1.34 RATIO — HIGH (ref 0.88–1.16)
MAGNESIUM SERPL-MCNC: 2.1 MG/DL — SIGNIFICANT CHANGE UP (ref 1.6–2.6)
MCHC RBC-ENTMCNC: 29.3 PG — SIGNIFICANT CHANGE UP (ref 27–34)
MCHC RBC-ENTMCNC: 32.9 GM/DL — SIGNIFICANT CHANGE UP (ref 32–36)
MCV RBC AUTO: 89.1 FL — SIGNIFICANT CHANGE UP (ref 80–100)
PLATELET # BLD AUTO: 123 K/UL — LOW (ref 150–400)
POTASSIUM SERPL-MCNC: 4.2 MMOL/L — SIGNIFICANT CHANGE UP (ref 3.5–5.3)
POTASSIUM SERPL-SCNC: 4.2 MMOL/L — SIGNIFICANT CHANGE UP (ref 3.5–5.3)
PROTHROM AB SERPL-ACNC: 15.6 SEC — HIGH (ref 10.5–13.4)
RBC # BLD: 5.6 M/UL — SIGNIFICANT CHANGE UP (ref 4.2–5.8)
RBC # FLD: 12.6 % — SIGNIFICANT CHANGE UP (ref 10.3–14.5)
SARS-COV-2 N GENE NPH QL NAA+PROBE: NOT DETECTED
SODIUM SERPL-SCNC: 139 MMOL/L — SIGNIFICANT CHANGE UP (ref 135–145)
WBC # BLD: 4.78 K/UL — SIGNIFICANT CHANGE UP (ref 3.8–10.5)
WBC # FLD AUTO: 4.78 K/UL — SIGNIFICANT CHANGE UP (ref 3.8–10.5)

## 2022-09-22 PROCEDURE — 93010 ELECTROCARDIOGRAM REPORT: CPT | Mod: 77

## 2022-09-22 PROCEDURE — 93656 COMPRE EP EVAL ABLTJ ATR FIB: CPT

## 2022-09-22 PROCEDURE — 92960 CARDIOVERSION ELECTRIC EXT: CPT | Mod: 59

## 2022-09-22 RX ORDER — ATORVASTATIN CALCIUM 80 MG/1
20 TABLET, FILM COATED ORAL AT BEDTIME
Refills: 0 | Status: DISCONTINUED | OUTPATIENT
Start: 2022-09-22 | End: 2022-09-23

## 2022-09-22 RX ORDER — METOPROLOL TARTRATE 50 MG
100 TABLET ORAL DAILY
Refills: 0 | Status: DISCONTINUED | OUTPATIENT
Start: 2022-09-22 | End: 2022-09-23

## 2022-09-22 RX ORDER — TAMSULOSIN HYDROCHLORIDE 0.4 MG/1
1 CAPSULE ORAL
Qty: 0 | Refills: 0 | DISCHARGE

## 2022-09-22 RX ORDER — ACETAMINOPHEN 500 MG
650 TABLET ORAL EVERY 6 HOURS
Refills: 0 | Status: DISCONTINUED | OUTPATIENT
Start: 2022-09-22 | End: 2022-09-23

## 2022-09-22 RX ORDER — FINASTERIDE 5 MG/1
5 TABLET, FILM COATED ORAL DAILY
Refills: 0 | Status: DISCONTINUED | OUTPATIENT
Start: 2022-09-22 | End: 2022-09-23

## 2022-09-22 RX ORDER — SUCRALFATE 1 G
1 TABLET ORAL
Refills: 0 | Status: DISCONTINUED | OUTPATIENT
Start: 2022-09-22 | End: 2022-09-23

## 2022-09-22 RX ORDER — TAMSULOSIN HYDROCHLORIDE 0.4 MG/1
0.4 CAPSULE ORAL AT BEDTIME
Refills: 0 | Status: DISCONTINUED | OUTPATIENT
Start: 2022-09-22 | End: 2022-09-23

## 2022-09-22 RX ORDER — FINASTERIDE 5 MG/1
1 TABLET, FILM COATED ORAL
Qty: 0 | Refills: 0 | DISCHARGE

## 2022-09-22 RX ORDER — DILTIAZEM HCL 120 MG
1 CAPSULE, EXT RELEASE 24 HR ORAL
Qty: 0 | Refills: 0 | DISCHARGE

## 2022-09-22 RX ORDER — ONDANSETRON 8 MG/1
4 TABLET, FILM COATED ORAL ONCE
Refills: 0 | Status: COMPLETED | OUTPATIENT
Start: 2022-09-22 | End: 2022-09-22

## 2022-09-22 RX ORDER — METOPROLOL TARTRATE 50 MG
1 TABLET ORAL
Qty: 0 | Refills: 0 | DISCHARGE

## 2022-09-22 RX ORDER — APIXABAN 2.5 MG/1
5 TABLET, FILM COATED ORAL
Refills: 0 | Status: DISCONTINUED | OUTPATIENT
Start: 2022-09-22 | End: 2022-09-23

## 2022-09-22 RX ORDER — APIXABAN 2.5 MG/1
1 TABLET, FILM COATED ORAL
Qty: 0 | Refills: 0 | DISCHARGE

## 2022-09-22 RX ORDER — FUROSEMIDE 40 MG
20 TABLET ORAL ONCE
Refills: 0 | Status: COMPLETED | OUTPATIENT
Start: 2022-09-22 | End: 2022-09-22

## 2022-09-22 RX ORDER — BENZOCAINE AND MENTHOL 5; 1 G/100ML; G/100ML
1 LIQUID ORAL
Refills: 0 | Status: DISCONTINUED | OUTPATIENT
Start: 2022-09-22 | End: 2022-09-23

## 2022-09-22 RX ORDER — OXYCODONE AND ACETAMINOPHEN 5; 325 MG/1; MG/1
1 TABLET ORAL EVERY 6 HOURS
Refills: 0 | Status: DISCONTINUED | OUTPATIENT
Start: 2022-09-22 | End: 2022-09-23

## 2022-09-22 RX ORDER — PANTOPRAZOLE SODIUM 20 MG/1
40 TABLET, DELAYED RELEASE ORAL
Refills: 0 | Status: DISCONTINUED | OUTPATIENT
Start: 2022-09-22 | End: 2022-09-23

## 2022-09-22 RX ORDER — DILTIAZEM HCL 120 MG
120 CAPSULE, EXT RELEASE 24 HR ORAL DAILY
Refills: 0 | Status: DISCONTINUED | OUTPATIENT
Start: 2022-09-22 | End: 2022-09-23

## 2022-09-22 RX ADMIN — ONDANSETRON 4 MILLIGRAM(S): 8 TABLET, FILM COATED ORAL at 23:45

## 2022-09-22 RX ADMIN — Medication 1 GRAM(S): at 18:47

## 2022-09-22 RX ADMIN — APIXABAN 5 MILLIGRAM(S): 2.5 TABLET, FILM COATED ORAL at 18:47

## 2022-09-22 RX ADMIN — Medication 20 MILLIGRAM(S): at 18:47

## 2022-09-22 RX ADMIN — PANTOPRAZOLE SODIUM 40 MILLIGRAM(S): 20 TABLET, DELAYED RELEASE ORAL at 18:47

## 2022-09-22 NOTE — DISCHARGE NOTE PROVIDER - NS AS DC PROVIDER CONTACT Y/N MULTI
Seen on HD    Vital Signs Last 24 Hrs  T(C): 36.4 (04-26-21 @ 16:51), Max: 36.9 (04-25-21 @ 21:31)  T(F): 97.5 (04-26-21 @ 16:51), Max: 98.4 (04-25-21 @ 21:31)  HR: 69 (04-26-21 @ 16:51) (65 - 69)  BP: 162/101 (04-26-21 @ 16:51) (128/74 - 164/67)  RR: 14 (04-26-21 @ 16:51) (14 - 15)  SpO2: 98% (04-26-21 @ 16:51) (97% - 98%)    s1s2  b/l air entry  soft  tr edema b/l BARBRA KIM AVF patent                                                                                                                  10.6   6.14  )-----------( 212      ( 26 Apr 2021 17:00 )             33.0     26 Apr 2021 17:00    135    |  99     |  65     ----------------------------<  124    4.8     |  27     |  5.54     Ca    9.0        26 Apr 2021 17:00  Phos  4.1       26 Apr 2021 17:00    acetaminophen   Tablet .. 650 milliGRAM(s) Oral every 6 hours PRN  artificial  tears Solution 1 Drop(s) Both EYES two times a day PRN  clopidogrel Tablet 75 milliGRAM(s) Oral daily  dextrose 40% Gel 15 Gram(s) Oral once  dextrose 5%. 1000 milliLiter(s) IV Continuous <Continuous>  dextrose 5%. 1000 milliLiter(s) IV Continuous <Continuous>  dextrose 50% Injectable 25 Gram(s) IV Push once  dextrose 50% Injectable 12.5 Gram(s) IV Push once  dextrose 50% Injectable 25 Gram(s) IV Push once  diltiazem    milliGRAM(s) Oral daily  DULoxetine 60 milliGRAM(s) Oral daily  epoetin juan-epbx (RETACRIT) Injectable 33002 Unit(s) IV Push <User Schedule>  glucagon  Injectable 1 milliGRAM(s) IntraMuscular once  heparin   Injectable 5000 Unit(s) SubCutaneous every 8 hours  insulin lispro (ADMELOG) corrective regimen sliding scale   SubCutaneous two times a day with meals  isosorbide   mononitrate ER Tablet (IMDUR) 30 milliGRAM(s) Oral daily  melatonin 6 milliGRAM(s) Oral at bedtime  Nephro-pito 1 Tablet(s) Oral daily  pantoprazole    Tablet 40 milliGRAM(s) Oral two times a day  polyethylene glycol 3350 17 Gram(s) Oral daily  senna 2 Tablet(s) Oral at bedtime  tamsulosin 0.4 milliGRAM(s) Oral at bedtime    A/P:    S/p complicated hospital course as per HPI  ESRD on HD  HD MWF  TMP 2kg w/HD as able  Epogen  Renal diet  Rehab    [phone]   Yes

## 2022-09-22 NOTE — H&P PST ADULT - ASSESSMENT
81 year old male with PMH of HTN, hyperlipidemia, BPH, kidney stones, and symptomatic atrial fibrillation s/p multiple cardioversions. He presents today for elective radiofrequency ablation with preceding RAMYA.     Confirms NPO > 8 hrs, last dose Eliquis 9/21/22 PM    - iv heplock  - stat labs, ECG   - consent oscar/MD

## 2022-09-22 NOTE — DISCHARGE NOTE PROVIDER - HOSPITAL COURSE
81 year old male with PMH of HTN, hyperlipidemia, BPH, kidney stones, and symptomatic atrial fibrillation s/p multiple cardioversions. He presented electively and is now status post uncomplicated radiofrequency ablation of atrial fibrillation (WACA/PVI, PWI, CTI) via RFV access. 81 year old male with PMH of HTN, hyperlipidemia, BPH, kidney stones, and symptomatic atrial fibrillation s/p multiple cardioversions. He presented electively and is now status post uncomplicated radiofrequency ablation of atrial fibrillation (WACA/PVI, PWI, CTI) via RFV access. The patient was observed overnight without event and was discharged home the following morning with a plan for outpatient follow up.

## 2022-09-22 NOTE — DISCHARGE NOTE PROVIDER - NSDCMRMEDTOKEN_GEN_ALL_CORE_FT
dilTIAZem 120 mg/24 hours oral capsule, extended release: 1 cap(s) orally once a day  Eliquis 5 mg oral tablet: 1 tab(s) orally 2 times a day  finasteride 5 mg oral tablet: 1 tab(s) orally once a day  Metoprolol Succinate  mg oral tablet, extended release: 1 tab(s) orally once a day  pravastatin 80 mg oral tablet: 1 tab(s) orally once a day  tamsulosin 0.4 mg oral capsule: 1 cap(s) orally once a day   dilTIAZem 120 mg/24 hours oral capsule, extended release: 1 cap(s) orally once a day  Eliquis 5 mg oral tablet: 1 tab(s) orally 2 times a day  finasteride 5 mg oral tablet: 1 tab(s) orally once a day  Metoprolol Succinate  mg oral tablet, extended release: 1 tab(s) orally once a day  pantoprazole 40 mg oral delayed release tablet: 1 tab(s) orally 2 times a day for 2 weeks, then once a day for 6 weeks.   pravastatin 80 mg oral tablet: 1 tab(s) orally once a day  sucralfate 1 g/10 mL oral suspension: 10 milliliter(s) orally 2 times a day  tamsulosin 0.4 mg oral capsule: 1 cap(s) orally once a day

## 2022-09-22 NOTE — H&P PST ADULT - HISTORY OF PRESENT ILLNESS
81 year old male with PMH of HTN, hyperlipidemia, BPH, and atrial fibrillation s/p  81 year old male with PMH of HTN, hyperlipidemia, BPH, kidney stones, and symptomatic atrial fibrillation s/p multiple cardioversions. He presents today for elective radiofrequency ablation with preceding RAMYA.     Cardiology summary:   RAMYA: 11/17/21: LVEF 55%, mild to mod AR, mild to mod MR, + PFO   Echo: 10/20/21: EF 60%, mild MR, mildly dilated left atrium, mild to mod AR, + PFO   Holter monitor: 10/8-10/2021: pAF, 4 beats of irregular tachycardia that appeared to be ventricular

## 2022-09-22 NOTE — DISCHARGE NOTE PROVIDER - NSDCFUSCHEDAPPT_GEN_ALL_CORE_FT
Ashish Ramirez  [facility]  [facility] [address]  Scheduled Appointment: 10/03/2022

## 2022-09-22 NOTE — H&P PST ADULT - NSICDXPASTMEDICALHX_GEN_ALL_CORE_FT
PAST MEDICAL HISTORY:  Atrial fibrillation     HTN (hypertension)     Hyperlipidemia     Kidney stone

## 2022-09-22 NOTE — DISCHARGE NOTE PROVIDER - CARE PROVIDER_API CALL
Ashish Ramirez (MD)  Cardiac Electrophysiology; Cardiology; Internal Medicine  [address] [place] [address] [id number]  Phone: [phone]  Fax: [phone]  Follow Up Time:

## 2022-09-23 ENCOUNTER — TRANSCRIPTION ENCOUNTER (OUTPATIENT)
Age: 82
End: 2022-09-23

## 2022-09-23 VITALS
SYSTOLIC BLOOD PRESSURE: 138 MMHG | DIASTOLIC BLOOD PRESSURE: 79 MMHG | RESPIRATION RATE: 16 BRPM | HEART RATE: 77 BPM | OXYGEN SATURATION: 96 %

## 2022-09-23 LAB
ANION GAP SERPL CALC-SCNC: 11 MMOL/L — SIGNIFICANT CHANGE UP (ref 5–17)
BUN SERPL-MCNC: 16.7 MG/DL — SIGNIFICANT CHANGE UP (ref 8–20)
CALCIUM SERPL-MCNC: 8.5 MG/DL — SIGNIFICANT CHANGE UP (ref 8.4–10.5)
CHLORIDE SERPL-SCNC: 104 MMOL/L — SIGNIFICANT CHANGE UP (ref 98–107)
CO2 SERPL-SCNC: 22 MMOL/L — SIGNIFICANT CHANGE UP (ref 22–29)
CREAT SERPL-MCNC: 1.11 MG/DL — SIGNIFICANT CHANGE UP (ref 0.5–1.3)
EGFR: 66 ML/MIN/1.73M2 — SIGNIFICANT CHANGE UP
GLUCOSE SERPL-MCNC: 118 MG/DL — HIGH (ref 70–99)
HCT VFR BLD CALC: 43.7 % — SIGNIFICANT CHANGE UP (ref 39–50)
HGB BLD-MCNC: 14.1 G/DL — SIGNIFICANT CHANGE UP (ref 13–17)
MAGNESIUM SERPL-MCNC: 1.9 MG/DL — SIGNIFICANT CHANGE UP (ref 1.6–2.6)
MCHC RBC-ENTMCNC: 28.9 PG — SIGNIFICANT CHANGE UP (ref 27–34)
MCHC RBC-ENTMCNC: 32.3 GM/DL — SIGNIFICANT CHANGE UP (ref 32–36)
MCV RBC AUTO: 89.5 FL — SIGNIFICANT CHANGE UP (ref 80–100)
PLATELET # BLD AUTO: 107 K/UL — LOW (ref 150–400)
POTASSIUM SERPL-MCNC: 4 MMOL/L — SIGNIFICANT CHANGE UP (ref 3.5–5.3)
POTASSIUM SERPL-SCNC: 4 MMOL/L — SIGNIFICANT CHANGE UP (ref 3.5–5.3)
RBC # BLD: 4.88 M/UL — SIGNIFICANT CHANGE UP (ref 4.2–5.8)
RBC # FLD: 12.7 % — SIGNIFICANT CHANGE UP (ref 10.3–14.5)
SODIUM SERPL-SCNC: 137 MMOL/L — SIGNIFICANT CHANGE UP (ref 135–145)
WBC # BLD: 5.64 K/UL — SIGNIFICANT CHANGE UP (ref 3.8–10.5)
WBC # FLD AUTO: 5.64 K/UL — SIGNIFICANT CHANGE UP (ref 3.8–10.5)

## 2022-09-23 PROCEDURE — 85730 THROMBOPLASTIN TIME PARTIAL: CPT

## 2022-09-23 PROCEDURE — 93010 ELECTROCARDIOGRAM REPORT: CPT

## 2022-09-23 PROCEDURE — 80048 BASIC METABOLIC PNL TOTAL CA: CPT

## 2022-09-23 PROCEDURE — 83735 ASSAY OF MAGNESIUM: CPT

## 2022-09-23 PROCEDURE — 85027 COMPLETE CBC AUTOMATED: CPT

## 2022-09-23 PROCEDURE — 92960 CARDIOVERSION ELECTRIC EXT: CPT | Mod: 59

## 2022-09-23 PROCEDURE — 86901 BLOOD TYPING SEROLOGIC RH(D): CPT

## 2022-09-23 PROCEDURE — 86900 BLOOD TYPING SEROLOGIC ABO: CPT

## 2022-09-23 PROCEDURE — 93005 ELECTROCARDIOGRAM TRACING: CPT

## 2022-09-23 PROCEDURE — 93312 ECHO TRANSESOPHAGEAL: CPT

## 2022-09-23 PROCEDURE — 86850 RBC ANTIBODY SCREEN: CPT

## 2022-09-23 PROCEDURE — 36415 COLL VENOUS BLD VENIPUNCTURE: CPT

## 2022-09-23 PROCEDURE — 93656 COMPRE EP EVAL ABLTJ ATR FIB: CPT

## 2022-09-23 PROCEDURE — 85610 PROTHROMBIN TIME: CPT

## 2022-09-23 PROCEDURE — 93325 DOPPLER ECHO COLOR FLOW MAPG: CPT

## 2022-09-23 PROCEDURE — 93320 DOPPLER ECHO COMPLETE: CPT

## 2022-09-23 RX ORDER — SUCRALFATE 1 G
10 TABLET ORAL
Qty: 280 | Refills: 0
Start: 2022-09-23 | End: 2022-10-06

## 2022-09-23 RX ORDER — MAGNESIUM SULFATE 500 MG/ML
2 VIAL (ML) INJECTION ONCE
Refills: 0 | Status: COMPLETED | OUTPATIENT
Start: 2022-09-23 | End: 2022-09-23

## 2022-09-23 RX ORDER — PANTOPRAZOLE SODIUM 20 MG/1
1 TABLET, DELAYED RELEASE ORAL
Qty: 70 | Refills: 0
Start: 2022-09-23

## 2022-09-23 RX ADMIN — Medication 100 MILLIGRAM(S): at 06:08

## 2022-09-23 RX ADMIN — Medication 1 GRAM(S): at 06:08

## 2022-09-23 RX ADMIN — APIXABAN 5 MILLIGRAM(S): 2.5 TABLET, FILM COATED ORAL at 06:08

## 2022-09-23 RX ADMIN — Medication 120 MILLIGRAM(S): at 06:15

## 2022-09-23 RX ADMIN — PANTOPRAZOLE SODIUM 40 MILLIGRAM(S): 20 TABLET, DELAYED RELEASE ORAL at 06:08

## 2022-09-23 RX ADMIN — Medication 25 GRAM(S): at 07:47

## 2022-09-23 NOTE — DISCHARGE NOTE NURSING/CASE MANAGEMENT/SOCIAL WORK - NSDCPEFALRISK_GEN_ALL_CORE
For information on Fall & Injury Prevention, visit: [email or web address] OR  [email or web address] OR  https://www.cdc.gov/steadi/patient.html

## 2022-09-23 NOTE — PROGRESS NOTE ADULT - SUBJECTIVE AND OBJECTIVE BOX
Pt doing well POD #1 s/p radiofrequency ablation of atrial fibrillation. No overnight events noted, pt denies complaint today. Right groin suture removed without difficulty, pt tolerated well.       EKG:   TELE: sinus rhythm, no events noted    PAST MEDICAL & SURGICAL HISTORY:  HTN (hypertension)  Hyperlipidemia  Atrial fibrillation  Kidney stone  S/P trigger finger release  S/P repair of hydrocele    MEDICATIONS  (STANDING):  apixaban 5 milliGRAM(s) Oral two times a day  atorvastatin 20 milliGRAM(s) Oral at bedtime  diltiazem    milliGRAM(s) Oral daily  finasteride 5 milliGRAM(s) Oral daily  metoprolol succinate  milliGRAM(s) Oral daily  pantoprazole    Tablet 40 milliGRAM(s) Oral two times a day  sucralfate suspension 1 Gram(s) Oral two times a day  tamsulosin 0.4 milliGRAM(s) Oral at bedtime    MEDICATIONS  (PRN):  acetaminophen     Tablet .. 650 milliGRAM(s) Oral every 6 hours PRN Mild Pain (1 - 3), Moderate Pain (4 - 6)  aluminum hydroxide/magnesium hydroxide/simethicone Suspension 30 milliLiter(s) Oral every 4 hours PRN Dyspepsia  benzocaine 15 mG/menthol 3.6 mG Lozenge 1 Lozenge Oral every 2 hours PRN Sore Throat  oxycodone    5 mG/acetaminophen 325 mG 1 Tablet(s) Oral every 6 hours PRN Severe Pain (7 - 10)    Allergies:  No Known Allergies    Vital Signs Last 24 Hrs  T(C): 35.9 (22 Sep 2022 19:23), Max: 36.5 (22 Sep 2022 09:34)  T(F): 96.6 (22 Sep 2022 19:23), Max: 97.7 (22 Sep 2022 09:34)  HR: 79 (23 Sep 2022 05:58) (72 - 109)  BP: 129/63 (23 Sep 2022 05:58) (122/58 - 147/66)  BP(mean): 110 (22 Sep 2022 09:10) (110 - 110)  RR: 16 (23 Sep 2022 05:58) (16 - 18)  SpO2: 94% (23 Sep 2022 05:58) (93% - 98%)    Parameters below as of 23 Sep 2022 05:58  Patient On (Oxygen Delivery Method): room air    Physical Exam:  Constitutional: NAD, AAOx3  Cardiovascular: +S1S2 RRR  Pulmonary: CTA b/l, unlabored  Abd: soft NTND +BS  Groin: C/D/I; no bleeding, hematoma, edema  Extremities: no pedal edema, +distal pulses b/l  Neuro: non focal, SOLER x4    LABS:                        14.1   5.64  )-----------( 107      ( 23 Sep 2022 05:57 )             43.7     09-23    137  |  104  |  16.7  ----------------------------<  118<H>  4.0   |  22.0  |  1.11    Ca    8.5      23 Sep 2022 05:57  Mg     1.9     09-23    PT/INR - ( 22 Sep 2022 09:20 )   PT: 15.6 sec;   INR: 1.34 ratio       PTT - ( 22 Sep 2022 09:20 )  PTT:36.8 sec    Assessment:   81 year old male with PMH of HTN, hyperlipidemia, BPH, kidney stones, and symptomatic atrial fibrillation s/p multiple cardioversions. He presented electively and is now POD#1 s/p uncomplicated radiofrequency ablation of atrial fibrillation (WACA/PVI, PWI, CTI) via RFV access.    Plan:   Eliquis 5mg Q12 hrs  Start Protonix 40mg BID x 2 weeks then daily X 6 weeks.     Start Carafate 1gm BID x 2 weeks.   Access site care and activity limitations reviewed w/ pt.   Stable for d/c home.   Outpt f/up in 2-4 weeks. 
Admission Criteria  Please admit the patient to the following service: CARDIOLOGY    Minor Criteria:  - Age >80 years    Major Criteria:  - Continuous EKG monitoring is required for condition causing arrhythmia (hyperkalemia, etc)  - Significant volume load > 200 ml    Admit to: 3GUL     Patient is being admitted to the inpatient service due to high risk characteristics and need for further management/monitoring and is considered to be at a significantly increased risk of major adverse cardiac and vascular events if discharged.  
PROCEDURE(S): Radiofrequency Ablation of Atrial Fibrillation    ELECTROPHYSIOLOGIST(S): Ashish Ramirez MD         COMPLICATIONS:  none        DISPOSITION: observation      CONDITION: stable    Pt doing well s/p elective radiofrequency atrial fibrillation ablation (WACA/PVI, PWI, CTI) via RFV access. Pt denies complaint post procedure.     MEDICATIONS  (STANDING):  apixaban 5 milliGRAM(s) Oral two times a day  atorvastatin 20 milliGRAM(s) Oral at bedtime  diltiazem    milliGRAM(s) Oral daily  finasteride 5 milliGRAM(s) Oral daily  furosemide   Injectable 20 milliGRAM(s) IV Push once  metoprolol succinate  milliGRAM(s) Oral daily  pantoprazole    Tablet 40 milliGRAM(s) Oral two times a day  sucralfate suspension 1 Gram(s) Oral two times a day  tamsulosin 0.4 milliGRAM(s) Oral at bedtime    MEDICATIONS  (PRN):  acetaminophen     Tablet .. 650 milliGRAM(s) Oral every 6 hours PRN Mild Pain (1 - 3), Moderate Pain (4 - 6)  aluminum hydroxide/magnesium hydroxide/simethicone Suspension 30 milliLiter(s) Oral every 4 hours PRN Dyspepsia  benzocaine 15 mG/menthol 3.6 mG Lozenge 1 Lozenge Oral every 2 hours PRN Sore Throat  oxycodone    5 mG/acetaminophen 325 mG 1 Tablet(s) Oral every 6 hours PRN Severe Pain (7 - 10)    Allergies:  No Known Allergies    VS:   T(C): 36.5 (09-22-22 @ 09:34), Max: 36.5 (09-22-22 @ 09:34)  HR: 73 (09-22-22 @ 15:35) (73 - 109)  BP: 122/58 (09-22-22 @ 15:20) (122/58 - 146/93)  RR: 16 (09-22-22 @ 15:35) (16 - 18)  SpO2: 94% (09-22-22 @ 15:35) (94% - 98%)  Post-procedure VS: BP 14/53 HR 73 O2 sat 93% RR 16     Physical exam:   awake, alert, no obvious distress  Card: S1/S2, RRR, no m/g/r  Resp: lungs CTA b/l  Abd: S/NT/ND  Groin (right): hemostatic sutures in place; sites C/D/I; no bleeding, hematoma, erythema, exudate or edema  Ext: no edema; distal pulses intact    RAMYA: Summary:   1. (+) PFO. No cardiac mass, vegetations, or thrombus visualized.   2. Mildly enlarged left atrium.   3. No left atrial or left atrial appendage thrombus visualized. Left atrial appendage enlargement and decreased left atrial appendage velocities. (+) PFO.   4. Spontaneous echo contrast ("smoke") is seen in the left atrial appendage, which is dense and the contrast is continuous.   5. Small patent foramen ovale, with predominantly left to right shunting across the atrial septum.   6. Left ventricular ejection fraction, by visual estimation, is 45 to 50%.   7. Elevated mean left atrial pressure.   8. Mildly enlarged right atrium.   9. Normal right ventricular size and function.  10. Mild aortic regurgitation.  11. Mild tricuspid regurgitation.  12. Trivial pericardial effusion.  13. There is mild aortic root calcification. Complex atheroma in descending aorta.  He Jacobo MD, RPVI Electronically signed on 9/22/2022 at 12:38:42 PM    ECG: NSR 75 bpm, intact conduction     Assessment:   81 year old male with PMH of HTN, hyperlipidemia, BPH, kidney stones, and symptomatic atrial fibrillation s/p multiple cardioversions. He presented electively and is now status post uncomplicated radiofrequency ablation of atrial fibrillation (WACA/PVI, PWI, CTI) via RFV access.    Plan:   Admit to telemetry/ONU  Bedrest x 4 hours, then OOB with assistance and progress as tolerated.   Groin sutures to be removed by EP service in AM.   Radial art line to be removed once pt fully awake with stable vitals >1 hour.    Pending groin status: Eliquis 5mg Q12 hrs to resume  Start Protonix 40mg BID x 2 weeks then daily X 6 weeks.     Start Carafate 1gm BID x 2 weeks.   Lasix 20mg IV x 1 upon ambulation   Continue other home medications.   Labs and EKG in AM.   Strict I/Os.  Please encourage incentive spirometry and ambulation once able.  Observation and monitoring on telemetry overnight with anticipated discharge in the AM and outpt follow up in 1 month.

## 2022-09-23 NOTE — DISCHARGE NOTE NURSING/CASE MANAGEMENT/SOCIAL WORK - PATIENT PORTAL LINK FT
You can access the FollowMyHealth Patient Portal offered by [facility] by registering at the following website: [email or web address]. By joining [facility]’s FollowMyHealth portal, you will also be able to view your health information using other applications (apps) compatible with our system.

## 2022-09-27 ENCOUNTER — NON-APPOINTMENT (OUTPATIENT)
Age: 82
End: 2022-09-27

## 2022-10-02 ENCOUNTER — RESULT CHARGE (OUTPATIENT)
Age: 82
End: 2022-10-02

## 2022-10-03 ENCOUNTER — APPOINTMENT (OUTPATIENT)
Dept: ELECTROPHYSIOLOGY | Facility: CLINIC | Age: 82
End: 2022-10-03

## 2022-10-03 ENCOUNTER — NON-APPOINTMENT (OUTPATIENT)
Age: 82
End: 2022-10-03

## 2022-10-03 VITALS
BODY MASS INDEX: 30.88 KG/M2 | DIASTOLIC BLOOD PRESSURE: 66 MMHG | TEMPERATURE: 96.9 F | SYSTOLIC BLOOD PRESSURE: 130 MMHG | HEIGHT: 72 IN | HEART RATE: 71 BPM | OXYGEN SATURATION: 97 % | WEIGHT: 228 LBS

## 2022-10-03 PROBLEM — I10 ESSENTIAL (PRIMARY) HYPERTENSION: Chronic | Status: ACTIVE | Noted: 2022-09-22

## 2022-10-03 PROBLEM — I48.91 UNSPECIFIED ATRIAL FIBRILLATION: Chronic | Status: ACTIVE | Noted: 2022-09-22

## 2022-10-03 PROBLEM — E78.5 HYPERLIPIDEMIA, UNSPECIFIED: Chronic | Status: ACTIVE | Noted: 2022-09-22

## 2022-10-03 PROBLEM — N20.0 CALCULUS OF KIDNEY: Chronic | Status: ACTIVE | Noted: 2022-09-22

## 2022-10-03 PROCEDURE — 93000 ELECTROCARDIOGRAM COMPLETE: CPT

## 2022-10-03 PROCEDURE — 99213 OFFICE O/P EST LOW 20 MIN: CPT | Mod: 25

## 2022-10-04 ENCOUNTER — APPOINTMENT (OUTPATIENT)
Dept: PULMONOLOGY | Facility: CLINIC | Age: 82
End: 2022-10-04

## 2022-10-04 VITALS
WEIGHT: 232 LBS | OXYGEN SATURATION: 98 % | BODY MASS INDEX: 33.21 KG/M2 | SYSTOLIC BLOOD PRESSURE: 140 MMHG | DIASTOLIC BLOOD PRESSURE: 74 MMHG | RESPIRATION RATE: 16 BRPM | HEART RATE: 74 BPM | HEIGHT: 70 IN

## 2022-10-04 PROCEDURE — 99204 OFFICE O/P NEW MOD 45 MIN: CPT

## 2022-10-04 RX ORDER — CEPHALEXIN 500 MG/1
500 CAPSULE ORAL
Qty: 40 | Refills: 0 | Status: DISCONTINUED | COMMUNITY
Start: 2022-07-17

## 2022-10-04 NOTE — PHYSICAL EXAM
[No Acute Distress] : no acute distress [Normal Conjunctiva] : normal conjunctiva [Normal Venous Pressure] : normal venous pressure [No Carotid Bruit] : no carotid bruit [Normal S1, S2] : normal S1, S2 [Clear Lung Fields] : clear lung fields [Non Tender] : non-tender [No Focal Deficits] : no focal deficits [Alert and Oriented] : alert and oriented [de-identified] : Regular rhythm

## 2022-10-04 NOTE — HISTORY OF PRESENT ILLNESS
[FreeTextEntry1] : PCP: Gt Chase MD\par Cardiologist: Lobito Nichols MD\par Patient is an 81-year-old man who is seen in follow-up evaluation status post catheter ablation for atrial fibrillation on 9/22/2022.\par \par He previously had persistent atrial fibrillation and had several cardioversion procedures done.  Patient had recurrent A. fib with symptoms and the ablation procedure was planned.\par \par He had pulmonary vein isolation as well as posterior wall and cavotricuspid isthmus ablation.  Patient tolerated procedure well without any complications.  In follow-up today he is remained in sinus rhythm and is feeling better.  He has no chest pain, shortness of breath, dizziness, lightheadedness, palpitations, cough, headaches, visual changes, abdominal pain or any issues relating to venous access right femoral.\par \par He is compliant with anticoagulation and has not missed a dosage.  He is also on his GI prophylaxis.\par \par Previous history: He has had prior A. fib and underwent previous electrical cardioversions.  After the cardioversion procedures he has done well for a short period of time before he had recurrence of A. fib.  During a time that he is was in sinus rhythm the patient felt much better with more energy.  Recently he does feel tired and fatigued when he pushes himself. \par His last echocardiogram was October 2021 EF 60%, mild mitral regurgitation, mild to moderate aortic regurgitation, mildly dilated left atrium with left atrial volume index 35 cc per metered square.  Atrial septal aneurysm with small PFO.  Diastolic dysfunction no pericardial effusion.  Left atrial diameter 4.9 cm left atrial volume index 35 cc per metered square.\par \par He has a prior history of hypertension and dyslipidemia.\par \par Previous history: The patient was scheduled to have elective finger surgery  Nov 2021 at [facility] [place]  and on the day of surgery was noted to be in atrial fibrillation with increased ventricular response.  At that time he had no symptoms he had driven to the hospital and was feeling well without any awareness of atrial fibrillation.  He was seen by Dr. Gt Garcia and started on Toprol- mg/day and Eliquis 5 mg twice daily.  He was previously taking losartan, amlodipine and metoprolol for his blood pressure.  He subsequently saw Dr. Lobito Nichols and the patient underwent a RAMYA cardioversion.   The patient was not aware of A. fib but in retrospect after the cardioversion he is felt better with more energy.  \par The patient has been active.\par He had an echocardiogram performed on 10/20/2021 that showed EF 60%, left atrial diameter 4.9 cm, left atrial volume index 25 cc/m².  This question of atrial septal aneurysm with a  small PFO.  There is no pericardial effusion.  The septal thickness is 1.0 cm and posterior wall thickness 1.1 cm.\par \par A previous monitor from 10/8/2021 for 2 days had shown paroxysmal A. fib and 4 beats of an irregular tachycardia that appeared to be ventricular.\par Patient reports that his creatinine was previously elevated and he is following up with a nephrologist.

## 2022-10-04 NOTE — DISCUSSION/SUMMARY
[FreeTextEntry1] : The patient is doing well status post AF ablation.\par \par His EKG today shows sinus rhythm.  He has no recurrence of arrhythmias based on symptoms.\par \par His examination was unremarkable.  The femoral site is healing well without any hematoma or bruits.  Distal pulses were normal.\par \par He shows no evidence of complication post procedure.  Patient has no GI symptoms.\par \par Recommendation would be for him to continue current medications (GI prophylaxis and continue on anticoagulation.\par \par He will have follow-up evaluation regarding his abnormal sleep study that showed severe obstructive sleep apnea.  We rediscussed risk factor modifications which would be weight loss, treatment of hypertension and treatment of sleep apnea.\par \par Patient will be seen in follow-up in approximate 3 months at which time we will do follow-up monitoring.  Patient was instructed to call should he have any symptoms.\par \par \par  [EKG obtained to assist in diagnosis and management of assessed problem(s)] : EKG obtained to assist in diagnosis and management of assessed problem(s)

## 2022-10-21 ENCOUNTER — OUTPATIENT (OUTPATIENT)
Dept: OUTPATIENT SERVICES | Facility: HOSPITAL | Age: 82
LOS: 1 days | End: 2022-10-21
Payer: MEDICARE

## 2022-10-21 DIAGNOSIS — G47.33 OBSTRUCTIVE SLEEP APNEA (ADULT) (PEDIATRIC): ICD-10-CM

## 2022-10-21 DIAGNOSIS — Z98.890 OTHER SPECIFIED POSTPROCEDURAL STATES: Chronic | ICD-10-CM

## 2022-10-21 PROCEDURE — 95811 POLYSOM 6/>YRS CPAP 4/> PARM: CPT

## 2022-10-21 PROCEDURE — 95811 POLYSOM 6/>YRS CPAP 4/> PARM: CPT | Mod: 26

## 2022-10-27 ENCOUNTER — APPOINTMENT (OUTPATIENT)
Dept: PULMONOLOGY | Facility: CLINIC | Age: 82
End: 2022-10-27

## 2022-12-07 ENCOUNTER — APPOINTMENT (OUTPATIENT)
Dept: PULMONOLOGY | Facility: CLINIC | Age: 82
End: 2022-12-07

## 2022-12-07 VITALS
RESPIRATION RATE: 16 BRPM | WEIGHT: 233 LBS | SYSTOLIC BLOOD PRESSURE: 136 MMHG | HEIGHT: 70 IN | OXYGEN SATURATION: 98 % | BODY MASS INDEX: 33.36 KG/M2 | DIASTOLIC BLOOD PRESSURE: 70 MMHG | HEART RATE: 74 BPM

## 2022-12-07 PROCEDURE — 99214 OFFICE O/P EST MOD 30 MIN: CPT

## 2022-12-07 NOTE — CONSULT LETTER
[Dear  ___] : Dear  [unfilled], [Courtesy Letter:] : I had the pleasure of seeing your patient, [unfilled], in my office today. [Consult Closing:] : Thank you very much for allowing me to participate in the care of this patient.  If you have any questions, please do not hesitate to contact me. [DrFlower  ___] : Dr. MANJARREZ

## 2022-12-07 NOTE — ASSESSMENT
[FreeTextEntry1] : Severe LEYDA; needs trt. CPAP not effectve; needs bpap. Underlying afib, HTN, obesity.

## 2022-12-07 NOTE — HISTORY OF PRESENT ILLNESS
[AHI: ___ per hour] : Apnea-hypopnea index:  [unfilled] per hour [Date: ___] : the most recent therapeutic polysomnogram was completed [unfilled] [FreeTextEntry1] : VAUTO Max IPAP 20; Min EPAP 12; PS 4; Simplus FFM L

## 2022-12-07 NOTE — PHYSICAL EXAM
[General Appearance - In No Acute Distress] : no acute distress [Low Lying Soft Palate] : low lying soft palate [Elongated Uvula] : elongated uvula [Enlarged Base of the Tongue] : enlargement of the base of the tongue [III] : III [Heart Sounds] : normal S1 and S2 [] : no respiratory distress [Respiration, Rhythm And Depth] : normal respiratory rhythm and effort [Exaggerated Use Of Accessory Muscles For Inspiration] : no accessory muscle use [Auscultation Breath Sounds / Voice Sounds] : lungs were clear to auscultation bilaterally [Skin Color & Pigmentation] : normal skin color and pigmentation [Oriented To Time, Place, And Person] : oriented to person, place, and time [Impaired Insight] : insight and judgment were intact [Affect] : the affect was normal [Normal Oropharynx] : abnormal oropharynx [FreeTextEntry1] : Irregular

## 2022-12-07 NOTE — REVIEW OF SYSTEMS
[Obesity] : obesity [Negative] : Psychiatric [FreeTextEntry3] : per HPI [FreeTextEntry8] : per HPI [FreeTextEntry9] : per HPI

## 2023-01-04 ENCOUNTER — APPOINTMENT (OUTPATIENT)
Dept: PULMONOLOGY | Facility: CLINIC | Age: 83
End: 2023-01-04

## 2023-01-04 ENCOUNTER — NON-APPOINTMENT (OUTPATIENT)
Age: 83
End: 2023-01-04

## 2023-01-04 ENCOUNTER — APPOINTMENT (OUTPATIENT)
Dept: ELECTROPHYSIOLOGY | Facility: CLINIC | Age: 83
End: 2023-01-04
Payer: MEDICARE

## 2023-01-04 VITALS
SYSTOLIC BLOOD PRESSURE: 132 MMHG | HEART RATE: 73 BPM | DIASTOLIC BLOOD PRESSURE: 70 MMHG | OXYGEN SATURATION: 95 % | BODY MASS INDEX: 33.21 KG/M2 | WEIGHT: 232 LBS | TEMPERATURE: 97.1 F | HEIGHT: 70 IN

## 2023-01-04 PROCEDURE — 99213 OFFICE O/P EST LOW 20 MIN: CPT | Mod: 25

## 2023-01-04 PROCEDURE — 93000 ELECTROCARDIOGRAM COMPLETE: CPT

## 2023-01-11 ENCOUNTER — APPOINTMENT (OUTPATIENT)
Dept: PULMONOLOGY | Facility: CLINIC | Age: 83
End: 2023-01-11
Payer: MEDICARE

## 2023-01-11 PROCEDURE — 99213 OFFICE O/P EST LOW 20 MIN: CPT | Mod: 95

## 2023-01-11 NOTE — ASSESSMENT
[FreeTextEntry1] : Severe LEYDA; needs trt. CPAP not effectve; needs bpap. Underlying afib, HTN, obesity. Difficulty tolerating BPAP.

## 2023-01-11 NOTE — HISTORY OF PRESENT ILLNESS
[Home] : at home, [unfilled] , at the time of the visit. [Medical Office: ([facility])___] : at the medical office located in  [Verbal consent obtained from patient] : the patient, [unfilled] [AHI: ___ per hour] : Apnea-hypopnea index:  [unfilled] per hour [Date: ___] : the most recent therapeutic polysomnogram was completed [unfilled] [FreeTextEntry1] : VAUTO Max IPAP 20; Min EPAP 12; PS 4; Simplus FFM L

## 2023-02-08 ENCOUNTER — NON-APPOINTMENT (OUTPATIENT)
Age: 83
End: 2023-02-08

## 2023-02-08 ENCOUNTER — APPOINTMENT (OUTPATIENT)
Dept: CARDIOLOGY | Facility: CLINIC | Age: 83
End: 2023-02-08
Payer: MEDICARE

## 2023-02-08 VITALS
WEIGHT: 237 LBS | DIASTOLIC BLOOD PRESSURE: 64 MMHG | BODY MASS INDEX: 33.93 KG/M2 | TEMPERATURE: 98 F | SYSTOLIC BLOOD PRESSURE: 122 MMHG | HEIGHT: 70 IN | HEART RATE: 82 BPM | OXYGEN SATURATION: 97 %

## 2023-02-08 PROCEDURE — 99214 OFFICE O/P EST MOD 30 MIN: CPT | Mod: 25

## 2023-02-08 PROCEDURE — 93000 ELECTROCARDIOGRAM COMPLETE: CPT

## 2023-02-08 RX ORDER — PANTOPRAZOLE 40 MG/1
40 TABLET, DELAYED RELEASE ORAL
Qty: 70 | Refills: 0 | Status: DISCONTINUED | COMMUNITY
Start: 2022-09-23 | End: 2023-02-08

## 2023-02-08 RX ORDER — SUCRALFATE 1 G/10ML
1 SUSPENSION ORAL
Qty: 240 | Refills: 0 | Status: DISCONTINUED | COMMUNITY
Start: 2022-09-23 | End: 2023-02-08

## 2023-02-08 RX ORDER — PRAVASTATIN SODIUM 40 MG/1
40 TABLET ORAL
Refills: 0 | Status: DISCONTINUED | COMMUNITY
End: 2023-02-08

## 2023-02-08 RX ORDER — SILVER SULFADIAZINE 10 MG/G
1 CREAM TOPICAL
Qty: 60 | Refills: 0 | Status: DISCONTINUED | COMMUNITY
Start: 2022-07-19 | End: 2023-02-08

## 2023-02-08 RX ORDER — TRIAMCINOLONE ACETONIDE 1 MG/G
0.1 CREAM TOPICAL
Qty: 15 | Refills: 0 | Status: DISCONTINUED | COMMUNITY
Start: 2022-07-15 | End: 2023-02-08

## 2023-02-11 NOTE — ADDENDUM
[FreeTextEntry1] : \par Preoperative status [prostate] \par 02/08/2023 \par At present, there are no active cardiac conditions. \par No recent unstable coronary syndromes, decompensated heart failure, severe valvular heart disease or significant dysrhythmias.  \par Baseline functional status is acceptable.    \par The clinical benefit of the proposed procedure outweighs the associated cardiovascular risk.  \par Risk not attenuated with further CV testing.  \par Prior testing as outlined above.\par Optimized from a cardiovascular perspective.\par \par For surgery and invasive procedures, recommend to discontinue apixaban at least 48 hours prior to elective surgery or invasive procedures with a moderate-to-high risk of unacceptable or clinically significant bleeding.  Anticoagulation bridging during the 48 hours apixaban is interrupted and prior to the intervention is not generally required. Reinitiate apixaban when adequate hemostasis has been achieved.  If oral therapy cannot be administered, then consider administration of a parenteral anticoagulant. Note excess discontinuation of any oral anticoagulant, including apixaban, increases the risk of thrombotic events.

## 2023-02-11 NOTE — ASSESSMENT
[FreeTextEntry1] : rapid atrial fibrillation sx s/p DCCVs then RFA\par recurrent atrial fibrillation \par ep eval\par rate control, f/u monitor\par refilled anticoagulation \par May hold Eliquis prior to prostate biopsy without bridging \par treat sleep apnea \par \par VHD, HTN, HL\par follow-up ultrasound imaging \par bp control\par statin\par Low salt diet. \par

## 2023-02-11 NOTE — HISTORY OF PRESENT ILLNESS
[FreeTextEntry1] : SUJIT JIMENEZ  is a 82 year old  M\par \par h/o HTN, HLD, sx AF sp DCCV/RFA, VHD, asc\par There is no prior history of a clinical myocardial infarction, coronary revascularization. \par \par baseline active with golf and yard work. \par \par Initially at [place] for elective finger surgery. \par Day of surgery had AF with RVR. \par Surgery was deferred. \par rate controlled (amlod to dilt and increased beta blocker) and started anticoagulation\par improvement in symptoms s/p DCCV 11/21\par no bleeding complications on AC. \par \par Underwent another cardioversion February 2022\par then ablation procedure 9/22\par but now has recurrent A-fib\par \par there is fatigue associated with the A-fib\par There is no exertional chest pain, pressure or discomfort. \par There are no symptomatic palpitations, lightheadedness, dizziness or syncope.\par \par now scheduled for prostate biopsy\par there is no prior history of DVT TIA and CVA\par \par his son works for [employer] \par \par December 2022 hemoglobin 15.5 creatinine 1.2 total cholesterol 151 LDL 85 \par EKG demonstrates atrial fibrillation\par nuclear stress test December 2021 inferoapical defect with concomitant attenuation normal left ventricular function and no ischemia \par monitor demonstrates atrial fibrillation with an average rate of 98 \par Transesophageal echo has normal left ventricular function mild to moderate aortic and mitral regurgitation aortic atherosclerosis no thrombus \par echocardiogram demonstrates normal left ventricular function mild mitral regurgitation mild to moderate aortic regurgitation mild ascending aortic dilatation mild pulmonary hypertension\par \par

## 2023-02-14 ENCOUNTER — APPOINTMENT (OUTPATIENT)
Dept: PULMONOLOGY | Facility: CLINIC | Age: 83
End: 2023-02-14
Payer: MEDICARE

## 2023-02-14 VITALS
HEIGHT: 70 IN | DIASTOLIC BLOOD PRESSURE: 76 MMHG | HEART RATE: 97 BPM | SYSTOLIC BLOOD PRESSURE: 124 MMHG | OXYGEN SATURATION: 97 % | BODY MASS INDEX: 33.93 KG/M2 | WEIGHT: 237 LBS | RESPIRATION RATE: 16 BRPM

## 2023-02-14 PROCEDURE — 99214 OFFICE O/P EST MOD 30 MIN: CPT

## 2023-02-16 ENCOUNTER — APPOINTMENT (OUTPATIENT)
Dept: CARDIOLOGY | Facility: CLINIC | Age: 83
End: 2023-02-16
Payer: MEDICARE

## 2023-02-16 PROCEDURE — 93242 EXT ECG>48HR<7D RECORDING: CPT

## 2023-02-17 NOTE — ASSESSMENT
[FreeTextEntry1] : Severe LEYDA; needs trt. CPAP not effectve; needs bpap. Compliant on BPAP but needs pressure readjustment. Underlying afib, HTN, obesity. \par \par The patient is using BPAP well, is compliant with it's use.  The patient should continue to use BPAP.\par

## 2023-03-02 ENCOUNTER — APPOINTMENT (OUTPATIENT)
Dept: CARDIOLOGY | Facility: CLINIC | Age: 83
End: 2023-03-02
Payer: MEDICARE

## 2023-03-02 PROCEDURE — 93244 EXT ECG>48HR<7D REV&INTERPJ: CPT

## 2023-03-03 ENCOUNTER — NON-APPOINTMENT (OUTPATIENT)
Age: 83
End: 2023-03-03

## 2023-03-07 ENCOUNTER — NON-APPOINTMENT (OUTPATIENT)
Age: 83
End: 2023-03-07

## 2023-03-08 ENCOUNTER — APPOINTMENT (OUTPATIENT)
Dept: CARDIOLOGY | Facility: CLINIC | Age: 83
End: 2023-03-08
Payer: MEDICARE

## 2023-03-08 VITALS
BODY MASS INDEX: 33.79 KG/M2 | HEART RATE: 91 BPM | WEIGHT: 236 LBS | TEMPERATURE: 98.6 F | HEIGHT: 70 IN | DIASTOLIC BLOOD PRESSURE: 80 MMHG | OXYGEN SATURATION: 97 % | SYSTOLIC BLOOD PRESSURE: 134 MMHG | RESPIRATION RATE: 14 BRPM

## 2023-03-08 DIAGNOSIS — I70.0 ATHEROSCLEROSIS OF AORTA: ICD-10-CM

## 2023-03-08 PROCEDURE — 93306 TTE W/DOPPLER COMPLETE: CPT

## 2023-03-08 PROCEDURE — 99214 OFFICE O/P EST MOD 30 MIN: CPT

## 2023-03-08 PROCEDURE — 93880 EXTRACRANIAL BILAT STUDY: CPT

## 2023-03-08 PROCEDURE — 93979 VASCULAR STUDY: CPT

## 2023-03-08 NOTE — REASON FOR VISIT
[Symptom and Test Evaluation] : symptom and test evaluation [Arrhythmia/ECG Abnorrmalities] : arrhythmia/ECG abnormalities [FreeTextEntry1] : Please note the patient was reviewed with NP Danyell Alvares.\par I was physically present during the service of the patient.\par I was directly involved in the management plan and recommendations of the care provided to the patient. \par I personally reviewed the history and physical examination as documented by the NP above.\par \par

## 2023-03-08 NOTE — ASSESSMENT
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 \par \par Sincerely,\par \par TIM Webb\par Patients history, testing, and plan reviewed with supervising MD: Dr. Lobito Nichols

## 2023-03-08 NOTE — HISTORY OF PRESENT ILLNESS
[FreeTextEntry1] : SUJIT JIMENEZ  is a 82 year old  M\par \par h/o HTN, HLD, sx AF sp DCCV/RFA, VHD, asc\par There is no prior history of a clinical myocardial infarction, coronary revascularization. \par baseline active with golf and yard work. \par \par Initially at [place] for elective finger surgery. \par Day of surgery had AF with RVR. \par Surgery was deferred. \par rate controlled (amlod to dilt and increased beta blocker) and started anticoagulation\par improvement in symptoms s/p DCCV 11/21\par Underwent another cardioversion February 2022\par then ablation procedure Sept 22\par but now has recurrent A-fib\par \par there is fatigue associated with the A-fib\par Feels winded with excess activity or moving too quickly \par There is no exertional chest pain, pressure or discomfort. \par There are no symptomatic palpitations, lightheadedness, dizziness or syncope.\par he was told he may need new sleep study, sees pulm for LEYDA/CPAP management \par now scheduled for prostate biopsy, pending next week \par there is no prior history of DVT TIA and CVA\par \par his son works for [employer]\par \par Echo 3/8/23 Ef 50-55%, mild ME, mild to mod AI, mod to severe PH, asc ao 4.2cm \par abd sono 3/2023 no AAA\par carotid sono 3/2023 mild atherosclerosis \par event monitor 2/2023 persistent AF avg 90 bpm\par December 2022 hemoglobin 15.5 creatinine 1.2 total cholesterol 151 LDL 85 \par EKG demonstrates atrial fibrillation\par nuclear stress test December 2021 inferoapical defect with concomitant attenuation normal left ventricular function and no ischemia \par Transesophageal echo has normal left ventricular function mild to moderate aortic and mitral regurgitation aortic atherosclerosis no thrombus \par \par \par

## 2023-03-09 ENCOUNTER — NON-APPOINTMENT (OUTPATIENT)
Age: 83
End: 2023-03-09

## 2023-04-02 NOTE — REVIEW OF SYSTEMS
[Weight Gain (___ Lbs)] : [unfilled] ~Ulb weight gain [SOB] : no shortness of breath [Chest Discomfort] : no chest discomfort [Abdominal Pain] : no abdominal pain [Hematuria] : no hematuria [Under Stress] : not under stress [Easy Bleeding] : no tendency for easy bleeding

## 2023-04-02 NOTE — HISTORY OF PRESENT ILLNESS
[FreeTextEntry1] : Patient is an 82-year-old man who is seen in follow-up evaluation of atrial fibrillation.\par \par He is status post previous ablation 9/22/2022.  He underwent PVI, posterior as well as CTI flutter line.\par \par He has been feeling well since his ablation procedure.  His symptoms improved.\par \par Current medications include diltiazem, Eliquis, metoprolol.\par \par His EKG today showed atrial fibrillation\par \par Risk factors hypertension, age and sleep apnea.  He has been having trouble with his CPAP and has an appointment with pulmonary today for reassessment, readjustment.\par \par Previous atrial fibrillation history: The patient had prior A. fib after COVID infection told it was related.  He underwent electrical cardioversion and then later had recurrence and had another cardioversion.  After recurrences he was then scheduled for ablation procedure.  To in A. fib he felt fatigued but did not feel palpitations.\par \par \par \par

## 2023-04-02 NOTE — DISCUSSION/SUMMARY
[EKG obtained to assist in diagnosis and management of assessed problem(s)] : EKG obtained to assist in diagnosis and management of assessed problem(s) [FreeTextEntry1] : Patient has recurrent AF after cardioversion.  He previously underwent catheter ablation for persistent atrial fibrillation and had improvement in symptoms.  He is not aware of the A. fib currently.  He is on a controlled medication for blood pressure as well.  Patient is also on anticoagulation with Eliquis 5 mg twice daily.\par \par His blood pressure is controlled.\par \par He is having trouble with treatment of his sleep apnea will get readjustment of his mask/device.\par \par His AF seems to be paroxysmal based on examination today.  We will get a follow-up monitoring.  We may consider starting  him on antiarrhythmic and consider another ablation procedure in the future.  In the meantime he would like to optimize his risk profile.  Optimal treatment of his sleep apnea.\par \par He will have follow-up assessment after repeat monitor which was ordered\par \par The patient to follow-up with pulmonary today.  I have asked him to follow-up with Dr. Nichols - Cardiology and Primary Care - Dr. Chase.

## 2023-04-02 NOTE — PHYSICAL EXAM
[No Acute Distress] : no acute distress [Normal Venous Pressure] : normal venous pressure [Normal S1, S2] : normal S1, S2 [Non Tender] : non-tender [Normal Gait] : normal gait [No Focal Deficits] : no focal deficits [Alert and Oriented] : alert and oriented [de-identified] : Irregular

## 2023-04-10 ENCOUNTER — APPOINTMENT (OUTPATIENT)
Dept: ELECTROPHYSIOLOGY | Facility: CLINIC | Age: 83
End: 2023-04-10
Payer: MEDICARE

## 2023-04-10 ENCOUNTER — NON-APPOINTMENT (OUTPATIENT)
Age: 83
End: 2023-04-10

## 2023-04-10 VITALS
WEIGHT: 239 LBS | DIASTOLIC BLOOD PRESSURE: 84 MMHG | SYSTOLIC BLOOD PRESSURE: 143 MMHG | HEIGHT: 70 IN | BODY MASS INDEX: 34.22 KG/M2 | HEART RATE: 85 BPM | OXYGEN SATURATION: 95 %

## 2023-04-10 PROCEDURE — 93000 ELECTROCARDIOGRAM COMPLETE: CPT

## 2023-04-10 PROCEDURE — 99213 OFFICE O/P EST LOW 20 MIN: CPT | Mod: 25

## 2023-04-10 RX ORDER — DILTIAZEM HYDROCHLORIDE 120 MG/1
120 CAPSULE, EXTENDED RELEASE ORAL DAILY
Qty: 90 | Refills: 3 | Status: DISCONTINUED | COMMUNITY
Start: 2021-10-20 | End: 2023-04-10

## 2023-04-13 NOTE — DISCUSSION/SUMMARY
[FreeTextEntry1] : In summary the patient is a 82-year-old gentleman with history of hypertension, hyperlipidemia, sleep apnea and atrial fibrillation.  He has recurrent persistent atrial fibrillation status post ablation.  While he does not have palpitations he does have exercise intolerance while in atrial fibrillation.  We discussed the underlying pathophysiology of atrial fibrillation and treatment options at length.  At this point I do think he should better treat his sleep apnea.  In addition since he has the need for radiation therapy, and not I think we should avoid considering a repeat ablation until this is all resolved.  In the short-term 1 option would be to load him on amiodarone for and then perform a cardioversion to see if we can maintain sinus rhythm and allow for atrial remodeling.  The patient is amenable to this approach. [EKG obtained to assist in diagnosis and management of assessed problem(s)] : EKG obtained to assist in diagnosis and management of assessed problem(s)

## 2023-04-18 ENCOUNTER — APPOINTMENT (OUTPATIENT)
Dept: PULMONOLOGY | Facility: CLINIC | Age: 83
End: 2023-04-18
Payer: MEDICARE

## 2023-04-18 VITALS
DIASTOLIC BLOOD PRESSURE: 84 MMHG | HEIGHT: 70 IN | OXYGEN SATURATION: 97 % | WEIGHT: 239 LBS | SYSTOLIC BLOOD PRESSURE: 116 MMHG | HEART RATE: 64 BPM | BODY MASS INDEX: 34.22 KG/M2 | RESPIRATION RATE: 16 BRPM

## 2023-04-18 PROCEDURE — 99214 OFFICE O/P EST MOD 30 MIN: CPT

## 2023-04-18 NOTE — ASSESSMENT
[FreeTextEntry1] : Severe LEYDA; needs trt. CPAP not effectve; BPAP not effective either even at higher pressure; trt emergent CSA, therapeutic AHI elevated. EF 50-55%.  \par \par The patient is using BPAP well, is compliant with it's use.  The patient should continue to use BPAP.\par

## 2023-05-30 ENCOUNTER — OUTPATIENT (OUTPATIENT)
Dept: OUTPATIENT SERVICES | Facility: HOSPITAL | Age: 83
LOS: 1 days | End: 2023-05-30
Payer: MEDICARE

## 2023-05-30 DIAGNOSIS — Z98.890 OTHER SPECIFIED POSTPROCEDURAL STATES: Chronic | ICD-10-CM

## 2023-05-30 DIAGNOSIS — G47.33 OBSTRUCTIVE SLEEP APNEA (ADULT) (PEDIATRIC): ICD-10-CM

## 2023-05-30 PROCEDURE — 95811 POLYSOM 6/>YRS CPAP 4/> PARM: CPT | Mod: 26

## 2023-05-30 PROCEDURE — 95800 SLP STDY UNATTENDED: CPT

## 2023-06-12 ENCOUNTER — APPOINTMENT (OUTPATIENT)
Dept: CARDIOLOGY | Facility: CLINIC | Age: 83
End: 2023-06-12
Payer: MEDICARE

## 2023-06-12 VITALS
WEIGHT: 245 LBS | HEART RATE: 86 BPM | DIASTOLIC BLOOD PRESSURE: 80 MMHG | SYSTOLIC BLOOD PRESSURE: 140 MMHG | BODY MASS INDEX: 35.07 KG/M2 | HEIGHT: 70 IN | OXYGEN SATURATION: 96 %

## 2023-06-12 PROCEDURE — 99214 OFFICE O/P EST MOD 30 MIN: CPT

## 2023-06-13 ENCOUNTER — APPOINTMENT (OUTPATIENT)
Dept: PULMONOLOGY | Facility: CLINIC | Age: 83
End: 2023-06-13
Payer: MEDICARE

## 2023-06-13 VITALS
DIASTOLIC BLOOD PRESSURE: 78 MMHG | OXYGEN SATURATION: 97 % | BODY MASS INDEX: 35.07 KG/M2 | HEART RATE: 78 BPM | WEIGHT: 245 LBS | RESPIRATION RATE: 16 BRPM | HEIGHT: 70 IN | SYSTOLIC BLOOD PRESSURE: 134 MMHG

## 2023-06-13 PROCEDURE — 99214 OFFICE O/P EST MOD 30 MIN: CPT

## 2023-06-13 NOTE — PHYSICAL EXAM
[General Appearance - In No Acute Distress] : no acute distress [Normal Oropharynx] : abnormal oropharynx [Low Lying Soft Palate] : low lying soft palate [Elongated Uvula] : elongated uvula [Enlarged Base of the Tongue] : enlargement of the base of the tongue [III] : III [Heart Sounds] : normal S1 and S2 [FreeTextEntry1] : Irregular [] : no respiratory distress [Respiration, Rhythm And Depth] : normal respiratory rhythm and effort [Exaggerated Use Of Accessory Muscles For Inspiration] : no accessory muscle use [Auscultation Breath Sounds / Voice Sounds] : lungs were clear to auscultation bilaterally [Skin Color & Pigmentation] : normal skin color and pigmentation [Oriented To Time, Place, And Person] : oriented to person, place, and time [Impaired Insight] : insight and judgment were intact [Affect] : the affect was normal

## 2023-06-13 NOTE — HISTORY OF PRESENT ILLNESS
[AHI: ___ per hour] : Apnea-hypopnea index:  [unfilled] per hour [Date: ___] : the most recent therapeutic polysomnogram was completed [unfilled] [ASV w/EPAP: ____ cmH2O] : ASV with EPAP: [unfilled] cmH2O [FreeTextEntry1] : VAUTO Max IPAP 20; Min EPAP 12; PS 4; Simplus FFM L

## 2023-06-20 ENCOUNTER — NON-APPOINTMENT (OUTPATIENT)
Age: 83
End: 2023-06-20

## 2023-06-20 NOTE — ASSESSMENT
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 \par

## 2023-06-20 NOTE — HISTORY OF PRESENT ILLNESS
[FreeTextEntry1] : SUJIT JIMENEZ  is a 82 year old  M\par SUJIT JIMENEZ is a 82 year old M\par \par h/o HTN, HLD, sx AF sp DCCV/RFA, VHD, asc\par There is no prior history of a clinical myocardial infarction, coronary revascularization. \par baseline active with golf and yard work. \par \par Initially at [place] for elective finger surgery. \par Day of surgery had AF with RVR. \par Surgery was deferred. \par rate controlled (amlod to dilt and increased beta blocker) and started anticoagulation\par improvement in symptoms s/p DCCV 11/21\par Underwent another cardioversion February 2022\par then ablation procedure Sept 22\par but now has recurrent A-fib\par \par there is fatigue associated with the A-fib\par Feels winded with excess activity or moving too quickly \par There is no exertional chest pain, pressure or discomfort. \par There are no symptomatic palpitations, lightheadedness, dizziness or syncope.\par he was told he may need new sleep study, sees pulm for LEYDA/CPAP management \par \par in the interim he is seen by electrophysiology\par he was started on amiodarone with plan for cardioversion. \par He has been compliant with anticoagulation \par There is subjective symptoms of exercise intolerance, fatigue and dyspnea.\par there is no prior history of DVT TIA and CVA\par \par pca underwent underwent biopsy and subsequent MRI.\par next consultation for radiation therapy \par also received hormonal therapy \par \par Blood work April 2023 creatinine 1.3 potassium 4.6 total cholesterol 173  \par \par his son works for [employer]\par \par Echo 3/8/23 Ef 50-55%, mild ME, mild to mod AI, mod to severe PH, asc ao 4.2cm \par abd sono 3/2023 no AAA\par carotid sono 3/2023 mild atherosclerosis \par event monitor 2/2023 persistent AF avg 90 bpm\par December 2022 hemoglobin 15.5 creatinine 1.2 total cholesterol 151 LDL 85 \par EKG demonstrates atrial fibrillation\par nuclear stress test December 2021 inferoapical defect with concomitant attenuation normal left ventricular function and no ischemia \par Transesophageal echo has normal left ventricular function mild to moderate aortic and mitral regurgitation aortic atherosclerosis no thrombus \par

## 2023-08-29 ENCOUNTER — APPOINTMENT (OUTPATIENT)
Dept: PULMONOLOGY | Facility: CLINIC | Age: 83
End: 2023-08-29
Payer: MEDICARE

## 2023-08-29 PROCEDURE — 94010 BREATHING CAPACITY TEST: CPT

## 2023-08-29 PROCEDURE — 94727 GAS DIL/WSHOT DETER LNG VOL: CPT

## 2023-08-29 PROCEDURE — 94729 DIFFUSING CAPACITY: CPT

## 2023-08-29 PROCEDURE — 85018 HEMOGLOBIN: CPT | Mod: QW

## 2023-09-05 ENCOUNTER — APPOINTMENT (OUTPATIENT)
Dept: PULMONOLOGY | Facility: CLINIC | Age: 83
End: 2023-09-05
Payer: MEDICARE

## 2023-09-05 PROCEDURE — 99443: CPT

## 2023-09-05 RX ORDER — ALBUTEROL SULFATE 90 UG/1
108 (90 BASE) INHALANT RESPIRATORY (INHALATION)
Qty: 1 | Refills: 3 | Status: ACTIVE | COMMUNITY
Start: 2023-09-05 | End: 1900-01-01

## 2023-09-05 NOTE — PLAN
[TextEntry] : Use ASV as ordered. Used FFM in the lab; he likes using FFM. PRN albutrerol wiht spacer. Reviewed use. F/U with cardio about afib and pulm HTN with possible diastolic dysfxn. Will review with cardiology.

## 2023-09-05 NOTE — ASSESSMENT
[FreeTextEntry1] : Severe LEYDA; needs trt. CPAP not effectve; BPAP not effective either even at higher pressure; trt emergent CSA, therapeutic AHI elevated. Now has ASV. EF 50-55%.  Mild obstruction on PFTs. Lifelong nonsmoker.

## 2023-09-05 NOTE — HISTORY OF PRESENT ILLNESS
[Home] : at home, [unfilled] , at the time of the visit. [Medical Office: ([facility])___] : at the medical office located in  [Verbal consent obtained from patient] : the patient, [unfilled] [AHI: ___ per hour] : Apnea-hypopnea index:  [unfilled] per hour [Date: ___] : the most recent therapeutic polysomnogram was completed [unfilled] [ASV w/EPAP: ____ cmH2O] : ASV with EPAP: [unfilled] cmH2O [FreeTextEntry1] : VAUTO Max IPAP 20; Min EPAP 12; PS 4; Simplus FFM L [Never] : never

## 2023-09-26 ENCOUNTER — NON-APPOINTMENT (OUTPATIENT)
Age: 83
End: 2023-09-26

## 2023-09-26 ENCOUNTER — APPOINTMENT (OUTPATIENT)
Dept: CARDIOLOGY | Facility: CLINIC | Age: 83
End: 2023-09-26
Payer: MEDICARE

## 2023-09-26 VITALS
WEIGHT: 240 LBS | HEIGHT: 70 IN | HEART RATE: 74 BPM | DIASTOLIC BLOOD PRESSURE: 80 MMHG | BODY MASS INDEX: 34.36 KG/M2 | SYSTOLIC BLOOD PRESSURE: 140 MMHG | OXYGEN SATURATION: 98 %

## 2023-09-26 PROCEDURE — 93000 ELECTROCARDIOGRAM COMPLETE: CPT

## 2023-09-26 PROCEDURE — 99215 OFFICE O/P EST HI 40 MIN: CPT | Mod: 25

## 2023-09-28 ENCOUNTER — APPOINTMENT (OUTPATIENT)
Dept: PULMONOLOGY | Facility: CLINIC | Age: 83
End: 2023-09-28
Payer: MEDICARE

## 2023-09-28 VITALS
WEIGHT: 240 LBS | HEART RATE: 78 BPM | HEIGHT: 70 IN | RESPIRATION RATE: 16 BRPM | BODY MASS INDEX: 34.36 KG/M2 | SYSTOLIC BLOOD PRESSURE: 124 MMHG | OXYGEN SATURATION: 97 % | DIASTOLIC BLOOD PRESSURE: 72 MMHG

## 2023-09-28 PROCEDURE — 99214 OFFICE O/P EST MOD 30 MIN: CPT

## 2023-09-28 RX ORDER — INHALER, ASSIST DEVICES
SPACER (EA) MISCELLANEOUS
Qty: 1 | Refills: 0 | Status: ACTIVE | COMMUNITY
Start: 2023-09-28 | End: 1900-01-01

## 2023-10-16 LAB
ANION GAP SERPL CALC-SCNC: 12 MMOL/L
BUN SERPL-MCNC: 18 MG/DL
CALCIUM SERPL-MCNC: 9.6 MG/DL
CHLORIDE SERPL-SCNC: 104 MMOL/L
CO2 SERPL-SCNC: 26 MMOL/L
CREAT SERPL-MCNC: 1.34 MG/DL
EGFR: 52 ML/MIN/1.73M2
GLUCOSE SERPL-MCNC: 107 MG/DL
HCT VFR BLD CALC: 44.8 %
HGB BLD-MCNC: 14.3 G/DL
MCHC RBC-ENTMCNC: 31 PG
MCHC RBC-ENTMCNC: 31.9 GM/DL
MCV RBC AUTO: 97.2 FL
PLATELET # BLD AUTO: 104 K/UL
POTASSIUM SERPL-SCNC: 4.1 MMOL/L
RBC # BLD: 4.61 M/UL
RBC # FLD: 13.7 %
SODIUM SERPL-SCNC: 142 MMOL/L
TSH SERPL-ACNC: 1.46 UIU/ML
WBC # FLD AUTO: 3.49 K/UL

## 2023-10-24 ENCOUNTER — NON-APPOINTMENT (OUTPATIENT)
Age: 83
End: 2023-10-24

## 2023-10-24 ENCOUNTER — APPOINTMENT (OUTPATIENT)
Dept: CARDIOLOGY | Facility: CLINIC | Age: 83
End: 2023-10-24
Payer: MEDICARE

## 2023-10-24 VITALS
HEIGHT: 70 IN | WEIGHT: 236 LBS | HEART RATE: 55 BPM | BODY MASS INDEX: 33.79 KG/M2 | SYSTOLIC BLOOD PRESSURE: 144 MMHG | DIASTOLIC BLOOD PRESSURE: 60 MMHG | OXYGEN SATURATION: 98 %

## 2023-10-24 PROCEDURE — 99215 OFFICE O/P EST HI 40 MIN: CPT | Mod: 25

## 2023-10-24 PROCEDURE — 93000 ELECTROCARDIOGRAM COMPLETE: CPT

## 2023-10-24 RX ORDER — INHALER, ASSIST DEVICES
SPACER (EA) MISCELLANEOUS
Qty: 1 | Refills: 0 | Status: DISCONTINUED | COMMUNITY
Start: 2023-09-05 | End: 2023-10-24

## 2023-11-07 RX ORDER — METOPROLOL SUCCINATE 50 MG/1
50 TABLET, EXTENDED RELEASE ORAL
Refills: 0 | Status: DISCONTINUED | COMMUNITY
End: 2023-11-07

## 2023-11-07 RX ORDER — METOPROLOL SUCCINATE 25 MG/1
25 TABLET, EXTENDED RELEASE ORAL
Qty: 90 | Refills: 3 | Status: ACTIVE | COMMUNITY
Start: 2023-11-07 | End: 1900-01-01

## 2023-11-20 ENCOUNTER — APPOINTMENT (OUTPATIENT)
Dept: ELECTROPHYSIOLOGY | Facility: CLINIC | Age: 83
End: 2023-11-20
Payer: MEDICARE

## 2023-11-20 VITALS
BODY MASS INDEX: 33.64 KG/M2 | SYSTOLIC BLOOD PRESSURE: 158 MMHG | OXYGEN SATURATION: 97 % | HEART RATE: 78 BPM | DIASTOLIC BLOOD PRESSURE: 79 MMHG | HEIGHT: 70 IN | WEIGHT: 235 LBS

## 2023-11-20 PROCEDURE — 99213 OFFICE O/P EST LOW 20 MIN: CPT | Mod: 25

## 2023-11-20 PROCEDURE — 93000 ELECTROCARDIOGRAM COMPLETE: CPT

## 2023-11-28 ENCOUNTER — LABORATORY RESULT (OUTPATIENT)
Age: 83
End: 2023-11-28

## 2023-12-05 ENCOUNTER — APPOINTMENT (OUTPATIENT)
Dept: CARDIOLOGY | Facility: CLINIC | Age: 83
End: 2023-12-05
Payer: MEDICARE

## 2023-12-05 VITALS
DIASTOLIC BLOOD PRESSURE: 58 MMHG | BODY MASS INDEX: 33.79 KG/M2 | RESPIRATION RATE: 16 BRPM | OXYGEN SATURATION: 98 % | WEIGHT: 236 LBS | HEIGHT: 70 IN | SYSTOLIC BLOOD PRESSURE: 140 MMHG | HEART RATE: 69 BPM

## 2023-12-05 PROCEDURE — 99214 OFFICE O/P EST MOD 30 MIN: CPT

## 2023-12-13 NOTE — HISTORY OF PRESENT ILLNESS
[FreeTextEntry1] : SUJIT JIMENEZ  is a 83 year old  M  h/o HTN, HLD, sx AF sp DCCV/RFA, VHD, asc There is no prior history of a clinical myocardial infarction, coronary revascularization. baseline active with golf and yard work.  Initially at [place] for elective finger surgery. Day of surgery had AF with RVR. Surgery was deferred. rate controlled (amlod to dilt and increased beta blocker) and started anticoagulation improvement in symptoms s/p DCCV 11/21 Underwent another cardioversion February 2022 then ablation procedure Sept 22 but now has recurrent A-fib  there is fatigue associated with the A-fib Feels winded with excess activity or moving too quickly There is no exertional chest pain, pressure or discomfort. There are no symptomatic palpitations, or syncope.  seen by electrophysiology started on amiodarone with plan for cardioversion. compliant with anticoagulation there is no prior history of DVT TIA and CVA  he is now S/P RAMYA/DCCV on 10/18/23 at [facility]AY no thrombus, normal EF EKG 10/24/23 sinus guicho 51 bpm, has apple watch for HR  His beta blocker was reduced for persistent bradycardia to now toprol 25mg daily. As a result BP increased so norvasc added and uptitrated to 10mg daily then losartan added 50mg daily.  /58 readings at home are improved. HR now 60s.  Lab K 4, cr 1.28  Saw EP decided to remain on amio 200mg daily. Deferred invasive rhythm control strategy. Pt feels improved QOL less SOB in NSR.  Seeing Dr. Suarez for LEYDA.   pca underwent underwent biopsy and subsequent MRI. received hormonal therapy In the interim he completed radiation treatment for prostate cancer he reports no evidence of disease  he has had follow-up with his radiation and medical oncologist.  He has also had follow-up with his pulmonologist and reports improvement in his sleep apnea treatment.  He remains active at the [employer] course but with increased activity has dyspnea and lightheadedness.  He recently saw his ophthalmologist (Dr. Orellana on [address]) and also had pulmonary function test performed with his pulmonologist (Dr. Suarez)  LFT 8/2023 and TSH 10/2023 WNL  pulmonary function test were performed August 2023  his son works for [employer]  Lab 10/11/23 hg 14.3, k 4.1, cr 1.3 Echo 3/8/23 Ef 50-55%, mild ME, mild to mod AI, mod to severe PH, asc ao 4.2cm abd sono 3/2023 no AAA carotid sono 3/2023 mild atherosclerosis event monitor 2/2023 persistent AF avg 90 bpm nuclear stress test December 2021 inferoapical defect with concomitant attenuation normal left ventricular function and no ischemia Transesophageal echo has normal left ventricular function mild to moderate aortic and mitral regurgitation aortic atherosclerosis no thrombus

## 2023-12-13 NOTE — ASSESSMENT
[FreeTextEntry1] : SUJIT JIMENEZ is a 82 year old M who presents today Dec 05, 2023 with the above history and the following active issues:   Sx AF RFA 9/2022, then recurrence  Successful DCCV 10/18/23 There is clinical improvement with restoration to sinus rhythm  Saw EP decided to cont AAD, deferred ablation  Cont beta blocker, amio, eliquis. Off dilt.  treatment of sleep apnea HTN low HR lead to reduction in beta blocker and increased BP Cont current dose of toprol, norvasc, losartan. Monitor BMP.  Low salt diet.  Home BP monitoring.   High risk medication with no evidence of toxicity at present. Routine monitoring to include: TFTs, LFTs, PFTs with DLCO, and ophthalmology exam. Reviewed risks, benefits, and adverse effects of medication. If there is any change in clinical status our office should be notified immediately. Patient verbalizes understanding.   PH, mod to severe LEYDA txt  VHD, HTN, HL BP controlled Mild to mod AI stable Mild ao dilatation 4.2cm Surveillance monitoring cont statin Low salt diet.  Discussed exertional symptoms such as decreased exercise tolerance, chest discomfort, or shortness of breath which would warrant sooner evaluation/further investigation.  Any questions and concerns were addressed and resolved.   Sincerely,  TIM Webb Patients history, testing, and plan reviewed with supervising MD: Dr. Lobito Nichols

## 2023-12-21 ENCOUNTER — APPOINTMENT (OUTPATIENT)
Dept: PULMONOLOGY | Facility: CLINIC | Age: 83
End: 2023-12-21
Payer: MEDICARE

## 2023-12-21 VITALS
BODY MASS INDEX: 33.79 KG/M2 | WEIGHT: 236 LBS | RESPIRATION RATE: 16 BRPM | OXYGEN SATURATION: 97 % | SYSTOLIC BLOOD PRESSURE: 126 MMHG | HEIGHT: 70 IN | DIASTOLIC BLOOD PRESSURE: 62 MMHG | HEART RATE: 73 BPM

## 2023-12-21 PROCEDURE — 99214 OFFICE O/P EST MOD 30 MIN: CPT

## 2023-12-21 NOTE — PLAN
[TextEntry] : Use ASV; see if settings change happened and follow on those settings. He wants to continue wiht same mask.  Check data in 3 weeks.    PRN albutrerol with spacer.   F/U with cardio about afib and pulm HTN with possible diastolic dysfxn. PFT at least annually to check DLCO.

## 2023-12-21 NOTE — PHYSICAL EXAM
[General Appearance - In No Acute Distress] : no acute distress [Low Lying Soft Palate] : low lying soft palate [Elongated Uvula] : elongated uvula [Enlarged Base of the Tongue] : enlargement of the base of the tongue [III] : III [Heart Sounds] : normal S1 and S2 [] : no respiratory distress [Respiration, Rhythm And Depth] : normal respiratory rhythm and effort [Exaggerated Use Of Accessory Muscles For Inspiration] : no accessory muscle use [Auscultation Breath Sounds / Voice Sounds] : lungs were clear to auscultation bilaterally [Skin Color & Pigmentation] : normal skin color and pigmentation [Oriented To Time, Place, And Person] : oriented to person, place, and time [Impaired Insight] : insight and judgment were intact [Affect] : the affect was normal [Normal Oropharynx] : abnormal oropharynx [Heart Rate And Rhythm] : heart rate was normal and rhythm regular

## 2024-01-19 ENCOUNTER — NON-APPOINTMENT (OUTPATIENT)
Age: 84
End: 2024-01-19

## 2024-01-19 ENCOUNTER — APPOINTMENT (OUTPATIENT)
Dept: CARDIOLOGY | Facility: CLINIC | Age: 84
End: 2024-01-19
Payer: MEDICARE

## 2024-01-19 VITALS
DIASTOLIC BLOOD PRESSURE: 64 MMHG | SYSTOLIC BLOOD PRESSURE: 140 MMHG | OXYGEN SATURATION: 98 % | HEART RATE: 67 BPM | BODY MASS INDEX: 34.65 KG/M2 | HEIGHT: 70 IN | WEIGHT: 242 LBS

## 2024-01-19 PROCEDURE — 93000 ELECTROCARDIOGRAM COMPLETE: CPT

## 2024-01-19 PROCEDURE — 99214 OFFICE O/P EST MOD 30 MIN: CPT

## 2024-01-19 RX ORDER — AMLODIPINE BESYLATE 5 MG/1
5 TABLET ORAL
Qty: 90 | Refills: 3 | Status: ACTIVE | COMMUNITY
Start: 2023-11-07 | End: 1900-01-01

## 2024-01-19 RX ORDER — LOSARTAN POTASSIUM AND HYDROCHLOROTHIAZIDE 12.5; 5 MG/1; MG/1
50-12.5 TABLET ORAL DAILY
Qty: 90 | Refills: 3 | Status: ACTIVE | COMMUNITY
Start: 2024-01-19 | End: 1900-01-01

## 2024-01-19 RX ORDER — LOSARTAN POTASSIUM 50 MG/1
50 TABLET, FILM COATED ORAL
Qty: 90 | Refills: 3 | Status: DISCONTINUED | COMMUNITY
Start: 1900-01-01 | End: 2024-01-19

## 2024-02-19 NOTE — ASSESSMENT
[FreeTextEntry1] :  I have adjusted his medical therapy.  Reduced dose of amlodipine.  Add hydrochlorothiazide to losartan.   updated medication list has been provided to the patient.   follow-up echocardiogram and blood work  Sx AF RFA 9/2022, then recurrence  Successful DCCV 10/18/23 There is clinical improvement with restoration to sinus rhythm  Saw EP decided to cont AAD, deferred ablation  Cont beta blocker, amio, eliquis. Off dilt.  treatment of sleep apnea  HTN low HR lead to reduction in beta blocker and increased BP toprol, norvasc, losartan. Monitor BMP.  Low salt diet.  Home BP monitoring.   High risk medication with no evidence of toxicity at present. Routine monitoring to include: TFTs, LFTs, PFTs with DLCO, and ophthalmology exam. Reviewed risks, benefits, and adverse effects of medication. If there is any change in clinical status our office should be notified immediately. Patient verbalizes understanding.   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  Discussed exertional symptoms such as decreased exercise tolerance, chest discomfort, or shortness of breath which would warrant sooner evaluation/further investigation.  Any questions and concerns were addressed and resolved.

## 2024-02-19 NOTE — HISTORY OF PRESENT ILLNESS
[FreeTextEntry1] : SUJIT JIMENEZ  is a 83 year old  M h/o HTN, HLD, sx AF sp DCCV/RFA, VHD, asc There is no prior history of a clinical myocardial infarction, coronary revascularization. baseline active with golf and yard work.  Initially at [place] for elective finger surgery. Day of surgery had AF with RVR. Surgery was deferred. rate controlled (amlod to dilt and increased beta blocker) and started anticoagulation improvement in symptoms s/p DCCV 11/21 Underwent another cardioversion February 2022 then ablation procedure Sept 22 but now has recurrent A-fib  there is fatigue associated with the A-fib Feels winded with excess activity or moving too quickly There is no exertional chest pain, pressure or discomfort. There are no symptomatic palpitations, or syncope.  seen by electrophysiology started on amiodarone with plan for cardioversion. compliant with anticoagulation there is no prior history of DVT TIA and CVA  S/P RAMYA/[place] [facility] [place] thrombus, normal EF EKG 10/24/23 sinus guicho 51 bpm apple watch for HR His beta blocker was reduced for persistent bradycardia to now toprol 25mg daily. As a result BP increased so norvasc added and uptitrated to 10mg daily then losartan added 50mg daily.    Saw EP decided to remain on amio 200mg daily. Deferred invasive rhythm control strategy. Pt feels improved QOL less SOB in NSR.  Seeing Dr. Suarez for LEYDA.   pca underwent underwent biopsy and subsequent MRI. received hormonal therapy In the interim he completed radiation treatment for prostate cancer he reports no evidence of disease  he has had follow-up with his radiation and medical oncologist.   Today he returns the office with bilateral dependent lower extremity edema.   He reports his heart rate has been within normal limits.   He does have difficulty with the CPAP.   on today's exam his rhythm is regular  EKG was performed which demonstrates sinus rhythm with poor R wave progression and nonspecific ST changes blood work December 2023 total cholesterol 139 LDL 72   saw his ophthalmologist (Dr. Orellana on [place]) and also had pulmonary function test performed with his pulmonologist (Dr. Suarez)  LFT 8/2023 and TSH 10/2023 WNL  pulmonary function test were performed August 2023  his son works for [employer]  Lab 10/11/23 hg 14.3, k 4.1, cr 1.3 Echo 3/8/23 Ef 50-55%, mild ME, mild to mod AI, mod to severe PH, asc ao 4.2cm abd sono 3/2023 no AAA carotid sono 3/2023 mild atherosclerosis event monitor 2/2023 persistent AF avg 90 bpm nuclear stress test December 2021 inferoapical defect with concomitant attenuation normal left ventricular function and no ischemia Transesophageal echo has normal left ventricular function mild to moderate aortic and mitral regurgitation aortic atherosclerosis no thrombus

## 2024-02-20 ENCOUNTER — APPOINTMENT (OUTPATIENT)
Dept: PULMONOLOGY | Facility: CLINIC | Age: 84
End: 2024-02-20
Payer: MEDICARE

## 2024-02-20 VITALS
SYSTOLIC BLOOD PRESSURE: 130 MMHG | OXYGEN SATURATION: 97 % | RESPIRATION RATE: 16 BRPM | DIASTOLIC BLOOD PRESSURE: 70 MMHG | HEART RATE: 72 BPM | BODY MASS INDEX: 34.65 KG/M2 | WEIGHT: 242 LBS | HEIGHT: 70 IN

## 2024-02-20 PROCEDURE — 99214 OFFICE O/P EST MOD 30 MIN: CPT

## 2024-02-20 PROCEDURE — G2211 COMPLEX E/M VISIT ADD ON: CPT

## 2024-02-20 NOTE — HISTORY OF PRESENT ILLNESS
[AHI: ___ per hour] : Apnea-hypopnea index:  [unfilled] per hour [Date: ___] : the most recent therapeutic polysomnogram was completed [unfilled] [ASV w/EPAP: ____ cmH2O] : ASV with EPAP: [unfilled] cmH2O [Never] : never [Therapy based AHI: ___ /hr] : Therapy based AHI: [unfilled] / hr [FreeTextEntry1] : ASV auto EPAP 5-15, PS 0-20.

## 2024-02-20 NOTE — PLAN
[TextEntry] : Continue use of ASVauto. He wants to continue with same mask.  Check data in one month. PRN albutrerol with spacer.   F/U with cardio about afib and pulm HTN with possible diastolic dysfxn. PFT next visit.

## 2024-02-20 NOTE — PHYSICAL EXAM
Caller would like to discuss an/a Return call for Plan of Care. Writer advised caller of callback within 24-72 hours.    Patient Name: Maria Elena Bullard  Caller Name: same  Name of Facility: n/a  Callback Number: [phone]  Best Availability: anytime  Can A Detailed Message Be left? yes  Fax Number: n/a  Additional Info: Pt. Is trying to reach Dr. Andriy Drummond's (former provider) nurse to discuss her Plan of Care with another provider. Pt. states she called before but has not heard back.  Writer unable to assist pt. any further.   Did you confirm the message with the caller?: yes    Thank you,  Destiny Sidhu   [General Appearance - In No Acute Distress] : no acute distress [Low Lying Soft Palate] : low lying soft palate [Elongated Uvula] : elongated uvula [Enlarged Base of the Tongue] : enlargement of the base of the tongue [III] : III [Heart Rate And Rhythm] : heart rate was normal and rhythm regular [Heart Sounds] : normal S1 and S2 [] : no respiratory distress [Respiration, Rhythm And Depth] : normal respiratory rhythm and effort [Exaggerated Use Of Accessory Muscles For Inspiration] : no accessory muscle use [Auscultation Breath Sounds / Voice Sounds] : lungs were clear to auscultation bilaterally [Skin Color & Pigmentation] : normal skin color and pigmentation [Oriented To Time, Place, And Person] : oriented to person, place, and time [Impaired Insight] : insight and judgment were intact [Affect] : the affect was normal [Normal Oropharynx] : abnormal oropharynx

## 2024-02-23 ENCOUNTER — APPOINTMENT (OUTPATIENT)
Dept: CARDIOLOGY | Facility: CLINIC | Age: 84
End: 2024-02-23
Payer: MEDICARE

## 2024-02-23 VITALS
HEART RATE: 78 BPM | HEIGHT: 70 IN | DIASTOLIC BLOOD PRESSURE: 64 MMHG | WEIGHT: 242 LBS | SYSTOLIC BLOOD PRESSURE: 132 MMHG | BODY MASS INDEX: 34.65 KG/M2 | OXYGEN SATURATION: 97 %

## 2024-02-23 DIAGNOSIS — I34.0 NONRHEUMATIC MITRAL (VALVE) INSUFFICIENCY: ICD-10-CM

## 2024-02-23 DIAGNOSIS — R94.31 ABNORMAL ELECTROCARDIOGRAM [ECG] [EKG]: ICD-10-CM

## 2024-02-23 DIAGNOSIS — I47.29 OTHER VENTRICULAR TACHYCARDIA: ICD-10-CM

## 2024-02-23 PROCEDURE — 93306 TTE W/DOPPLER COMPLETE: CPT

## 2024-02-23 PROCEDURE — 99204 OFFICE O/P NEW MOD 45 MIN: CPT

## 2024-02-23 NOTE — ASSESSMENT
[FreeTextEntry1] : SUJIT JIMENEZ is a 83 year old M who presents today Feb 23, 2024 with the above history and the following active issues:   Sx AF RFA 9/2022, then recurrence  Successful DCCV 10/18/23 There is clinical improvement with restoration to sinus rhythm  Saw EP decided to cont AAD, deferred ablation  Cont beta blocker, amio, eliquis. Off dilt.  treatment of sleep apnea  HTN low HR lead to reduction in beta blocker and increased BP toprol, norvasc, losartan + hctz. Renal function and electrolytes are stable per recent labs.  Monitor BMP.  Note edema on higher dose of CCB, since resolved Echo normal LV systolic function and BNP wnl Low salt diet.  Home BP monitoring.   High risk medication with no evidence of toxicity at present. Routine monitoring to include: TFTs, LFTs, PFTs with DLCO, and ophthalmology exam. Reviewed risks, benefits, and adverse effects of medication. If there is any change in clinical status our office should be notified immediately. Patient verbalizes understanding.   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  Discussed exertional symptoms such as decreased exercise tolerance, chest discomfort, or shortness of breath which would warrant sooner evaluation/further investigation.  Any questions and concerns were addressed and resolved.   Sincerely,  SHIRAZ Webb-C Supervising MD: Dr. Albert Camacho

## 2024-02-23 NOTE — HISTORY OF PRESENT ILLNESS
[FreeTextEntry1] : SUJIT JIMENEZ  is a 83 year old  M h/o HTN, HLD, sx AF sp DCCV/RFA, VHD, asc There is no prior history of a clinical myocardial infarction, coronary revascularization. baseline active with golf and yard work.  Initially at [place] for elective finger surgery. Day of surgery had AF with RVR. Surgery was deferred. rate controlled (amlod to dilt and increased beta blocker) and started anticoagulation improvement in symptoms s/p DCCV 11/21 Underwent another cardioversion February 2022 then ablation procedure Sept 22 but now has recurrent A-fib  Then symptomatic recurrence of AF (fatigue winded with activity) S/P RAMYA/DCCV on 10/18/23 at [facility] [place] [facility] no thrombus, normal EF EKG 10/24/23 sinus guicho 51 bpm apple watch for HR His beta blocker was reduced for persistent bradycardia to now toprol 25mg daily.  Then HTN norvasc added had edema on 10mg dosing. Reduced to 5mg at last visit hctz added to losartan.  BP remains well controlled edema resolved and labs show K 4.3, cr 1.3 BNP 57 Jan 2024  Saw EP decided to remain on amio 200mg daily. Deferred invasive rhythm control strategy. Pt feels improved QOL less SOB in NSR.  Seeing Dr. Suarez for LEYDA.   pca underwent underwent biopsy and subsequent MRI. received hormonal therapy In the interim he completed radiation treatment for prostate cancer he reports no evidence of disease  he has had follow-up with his radiation and medical oncologist.   EKG was performed which demonstrates sinus rhythm with poor R wave progression and nonspecific ST changes blood work December 2023 total cholesterol 139 LDL 72  Lab 2/2024 BNP 57  Echo 2/2024 normal LV systolic function aortic root 4.1cm, asc ao 4cm, mod PH unchanged  saw his ophthalmologist (Dr. Orellana on [place]) and also had pulmonary function test performed with his pulmonologist (Dr. Suarez)  LFT 8/2023 and TSH 10/2023 WNL  pulmonary function test were performed August 2023  his son works for [employer]  abd sono 3/2023 no AAA carotid sono 3/2023 mild atherosclerosis event monitor 2/2023 persistent AF avg 90 bpm nuclear stress test December 2021 inferoapical defect with concomitant attenuation normal left ventricular function and no ischemia Transesophageal echo has normal left ventricular function mild to moderate aortic and mitral regurgitation aortic atherosclerosis no thrombus

## 2024-04-19 RX ORDER — AMIODARONE HYDROCHLORIDE 200 MG/1
200 TABLET ORAL DAILY
Qty: 90 | Refills: 0 | Status: ACTIVE | COMMUNITY
Start: 2023-04-10 | End: 1900-01-01

## 2024-04-26 ENCOUNTER — APPOINTMENT (OUTPATIENT)
Dept: ULTRASOUND IMAGING | Facility: CLINIC | Age: 84
End: 2024-04-26

## 2024-06-04 ENCOUNTER — NON-APPOINTMENT (OUTPATIENT)
Age: 84
End: 2024-06-04

## 2024-06-04 ENCOUNTER — APPOINTMENT (OUTPATIENT)
Dept: CARDIOLOGY | Facility: CLINIC | Age: 84
End: 2024-06-04
Payer: MEDICARE

## 2024-06-04 VITALS
OXYGEN SATURATION: 95 % | SYSTOLIC BLOOD PRESSURE: 124 MMHG | WEIGHT: 240 LBS | HEART RATE: 75 BPM | DIASTOLIC BLOOD PRESSURE: 60 MMHG | HEIGHT: 70 IN | BODY MASS INDEX: 34.36 KG/M2

## 2024-06-04 DIAGNOSIS — I10 ESSENTIAL (PRIMARY) HYPERTENSION: ICD-10-CM

## 2024-06-04 DIAGNOSIS — G47.33 OBSTRUCTIVE SLEEP APNEA (ADULT) (PEDIATRIC): ICD-10-CM

## 2024-06-04 DIAGNOSIS — Z01.810 ENCOUNTER FOR PREPROCEDURAL CARDIOVASCULAR EXAMINATION: ICD-10-CM

## 2024-06-04 DIAGNOSIS — Z86.79 OTHER SPECIFIED POSTPROCEDURAL STATES: ICD-10-CM

## 2024-06-04 DIAGNOSIS — E78.5 HYPERLIPIDEMIA, UNSPECIFIED: ICD-10-CM

## 2024-06-04 DIAGNOSIS — Z79.01 LONG TERM (CURRENT) USE OF ANTICOAGULANTS: ICD-10-CM

## 2024-06-04 DIAGNOSIS — I35.1 NONRHEUMATIC AORTIC (VALVE) INSUFFICIENCY: ICD-10-CM

## 2024-06-04 DIAGNOSIS — I48.91 UNSPECIFIED ATRIAL FIBRILLATION: ICD-10-CM

## 2024-06-04 DIAGNOSIS — Z98.890 OTHER SPECIFIED POSTPROCEDURAL STATES: ICD-10-CM

## 2024-06-04 PROCEDURE — 93000 ELECTROCARDIOGRAM COMPLETE: CPT

## 2024-06-04 PROCEDURE — 99214 OFFICE O/P EST MOD 30 MIN: CPT

## 2024-06-04 NOTE — HISTORY OF PRESENT ILLNESS
[FreeTextEntry1] : SUJIT JIMENEZ  is a 83 year old  M He does report having symptoms of fatigue.  He has baseline physically active.  Typically does over 15,000 steps when he works.  He has had hematuria related to a kidney stone.  There is upcoming colonoscopy.  If recurrent bleeding then we will address possible left atrial appendage occlusion.  At this point recommend long-term anticoagulation.  Follow-up monitor.  Anticoagulation may be held prior to colonoscopy without bridging.  Continue metoprolol and amiodarone through procedure.  Preoperative status ~  ~  06/04/2024  At present, there are no active cardiac conditions.  No recent unstable coronary syndromes, decompensated heart failure, severe valvular heart disease or significant dysrhythmias.   Baseline functional status is ~  ~ .     The clinical benefit of the proposed procedure outweighs the associated cardiovascular risk.   Risk not attenuated with further CV testing.   Prior testing as outlined above. Optimized from a cardiovascular perspective. For surgery and invasive procedures, recommend to discontinue apixaban at least 48 hours prior to elective surgery or invasive procedures with a moderate-to-high risk of unacceptable or clinically significant bleeding.  Anticoagulation bridging during the 48 hours apixaban is interrupted and prior to the intervention is not generally required. Reinitiate apixaban when adequate hemostasis has been achieved.  If oral therapy cannot be administered, then consider administration of a parenteral anticoagulant. Note excess discontinuation of any oral anticoagulant, including apixaban, increases the risk of thrombotic events. Last seen February 2024 ophthalmology evaluation March 2024 blood work April 2024 hemoglobin 13.6 platelets 103 creatinine 1.3 last cholesterol 139 LDL 73 April 2024 creatinine 1.5 Today's EKG demonstrates some normal sinus rhythm with poor R wave progressionh/  HTN, HLD, sx AF sp DCCV/RFA, VHD, asc There is no prior history of a clinical myocardial infarction, coronary revascularization. baseline active with golf and yard work.  Initially at [place] for elective finger surgery. Day of surgery had AF with RVR. Surgery was deferred. rate controlled (amlod to dilt and increased beta blocker) and started anticoagulation DCCVs then ablation procedure Sept 22 symptomatic recurrence of AF (fatigue winded with activity) S/P RAMYA/DCCV on 10/18/23 at [facility] [place] [facility] no thrombus, normal EF beta blocker was reduced for persistent bradycardia  Saw EP decided to remain on amio 200mg daily. Deferred invasive rhythm control strategy.  Seeing Dr. Suarez for LEYDA.  Then HTN  norvasc added had edema on 10mg dosing. Reduced to 5mg at last visit hctz added to losartan.   pca underwent underwent biopsy and subsequent MRI. received hormonal therapy In the interim he completed radiation treatment for prostate cancer he reports no evidence of disease  he has had follow-up with his radiation and medical oncologist.   Lab 2/2024 BNP 57  Echo 2/2024 normal LV systolic function aortic root 4.1cm, asc ao 4cm, mod PH unchanged  saw his ophthalmologist and also had pulmonary function test performed with his pulmonologist (Dr. Suarez)  pulmonary function test were performed August 2023  his son works for [employer]  abd sono 3/2023 no AAA carotid sono 3/2023 mild atherosclerosis event monitor 2/2023 persistent AF avg 90 bpm nuclear stress test December 2021 inferoapical defect with concomitant attenuation normal left ventricular function and no ischemia Transesophageal echo has normal left ventricular function mild to moderate aortic and mitral regurgitation aortic atherosclerosis no thrombus  AF RFA 9/2022 DCCV 10/18/23 EP decided to cont AAD, deferred ablation  Cont beta blocker, amio, eliquis. Off dilt.  treatment of sleep apnea  HTN low HR lead to reduction in beta blocker and increased BP toprol, norvasc, losartan + hctz. Renal function and electrolytes are stable per recent labs.  Note edema on higher dose of CCB, since resolved Echo normal LV systolic function and BNP wnl Low salt diet.  Home BP monitoring.   High risk medication with no evidence of toxicity at present. Routine monitoring to include: TFTs, LFTs, PFTs with DLCO, and ophthalmology exam. Reviewed risks, benefits, and adverse effects of medication. If there is any change in clinical status our office should be notified immediately. Patient verbalizes understanding.   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  Discussed exertional symptoms such as decreased exercise tolerance, chest discomfort, or shortness of breath which would warrant sooner evaluation/further investigation.  Any questions and concerns were addressed and resolved. 
1.91

## 2024-06-14 RX ORDER — APIXABAN 5 MG/1
5 TABLET, FILM COATED ORAL
Qty: 180 | Refills: 3 | Status: ACTIVE | COMMUNITY
Start: 1900-01-01 | End: 1900-01-01

## 2024-06-21 ENCOUNTER — APPOINTMENT (OUTPATIENT)
Dept: PULMONOLOGY | Facility: CLINIC | Age: 84
End: 2024-06-21
Payer: MEDICARE

## 2024-06-21 VITALS
HEART RATE: 77 BPM | RESPIRATION RATE: 16 BRPM | SYSTOLIC BLOOD PRESSURE: 138 MMHG | OXYGEN SATURATION: 97 % | DIASTOLIC BLOOD PRESSURE: 58 MMHG

## 2024-06-21 VITALS — BODY MASS INDEX: 34.17 KG/M2 | WEIGHT: 236 LBS | HEIGHT: 69.5 IN

## 2024-06-21 DIAGNOSIS — J44.9 CHRONIC OBSTRUCTIVE PULMONARY DISEASE, UNSPECIFIED: ICD-10-CM

## 2024-06-21 DIAGNOSIS — Z79.899 OTHER LONG TERM (CURRENT) DRUG THERAPY: ICD-10-CM

## 2024-06-21 DIAGNOSIS — G47.31 PRIMARY CENTRAL SLEEP APNEA: ICD-10-CM

## 2024-06-21 DIAGNOSIS — I27.20 PULMONARY HYPERTENSION, UNSPECIFIED: ICD-10-CM

## 2024-06-21 DIAGNOSIS — J45.909 UNSPECIFIED ASTHMA, UNCOMPLICATED: ICD-10-CM

## 2024-06-21 DIAGNOSIS — G47.33 OBSTRUCTIVE SLEEP APNEA (ADULT) (PEDIATRIC): ICD-10-CM

## 2024-06-21 PROCEDURE — 94729 DIFFUSING CAPACITY: CPT

## 2024-06-21 PROCEDURE — 94010 BREATHING CAPACITY TEST: CPT

## 2024-06-21 PROCEDURE — 99214 OFFICE O/P EST MOD 30 MIN: CPT | Mod: 25

## 2024-06-21 PROCEDURE — 85018 HEMOGLOBIN: CPT | Mod: QW

## 2024-06-21 PROCEDURE — 94727 GAS DIL/WSHOT DETER LNG VOL: CPT

## 2024-06-21 RX ORDER — LINACLOTIDE 72 UG/1
CAPSULE, GELATIN COATED ORAL
Refills: 0 | Status: ACTIVE | COMMUNITY

## 2024-06-21 RX ORDER — FLUTICASONE FUROATE 200 UG/1
200 POWDER RESPIRATORY (INHALATION) DAILY
Qty: 3 | Refills: 3 | Status: ACTIVE | COMMUNITY
Start: 2024-06-21 | End: 1900-01-01

## 2024-06-21 NOTE — HISTORY OF PRESENT ILLNESS
[AHI: ___ per hour] : Apnea-hypopnea index:  [unfilled] per hour [Date: ___] : the most recent therapeutic polysomnogram was completed [unfilled] [ASV w/EPAP: ____ cmH2O] : ASV with EPAP: [unfilled] cmH2O [Therapy based AHI: ___ /hr] : Therapy based AHI: [unfilled] / hr [FreeTextEntry1] : ASV auto EPAP 5-15, PS 0-20. [Never] : never

## 2024-06-21 NOTE — PLAN
[TextEntry] : Continue use of ASVauto. Increase use. He wants to continue with same mask.  Trial of ICS; arnuity or whatever is covered.  PRN albutrerol with spacer.   F/U with cardio about afib and pulm HTN with possible diastolic dysfxn. [place] next visit.

## 2024-06-21 NOTE — PHYSICAL EXAM
[General Appearance - In No Acute Distress] : no acute distress [Normal Oropharynx] : abnormal oropharynx [Low Lying Soft Palate] : low lying soft palate [Elongated Uvula] : elongated uvula [Enlarged Base of the Tongue] : enlargement of the base of the tongue [III] : III [Heart Rate And Rhythm] : heart rate was normal and rhythm regular [Heart Sounds] : normal S1 and S2 [Respiration, Rhythm And Depth] : normal respiratory rhythm and effort [] : no respiratory distress [Exaggerated Use Of Accessory Muscles For Inspiration] : no accessory muscle use [Auscultation Breath Sounds / Voice Sounds] : lungs were clear to auscultation bilaterally [Skin Color & Pigmentation] : normal skin color and pigmentation [Oriented To Time, Place, And Person] : oriented to person, place, and time [Impaired Insight] : insight and judgment were intact [Affect] : the affect was normal

## 2024-06-21 NOTE — PROCEDURE
[FreeTextEntry1] : compliance reivewed  PFT done today 6/21/24 continue to show obstruction but this time moderate range; air trapping seen; DLCO normal.

## 2024-07-01 ENCOUNTER — APPOINTMENT (OUTPATIENT)
Dept: RADIOLOGY | Facility: CLINIC | Age: 84
End: 2024-07-01
Payer: MEDICARE

## 2024-07-01 PROCEDURE — 73521 X-RAY EXAM HIPS BI 2 VIEWS: CPT

## 2024-07-01 PROCEDURE — 72114 X-RAY EXAM L-S SPINE BENDING: CPT

## 2024-07-20 ENCOUNTER — APPOINTMENT (OUTPATIENT)
Dept: MRI IMAGING | Facility: CLINIC | Age: 84
End: 2024-07-20

## 2024-08-19 ENCOUNTER — APPOINTMENT (OUTPATIENT)
Dept: MRI IMAGING | Facility: CLINIC | Age: 84
End: 2024-08-19

## 2024-08-19 PROCEDURE — 72148 MRI LUMBAR SPINE W/O DYE: CPT

## 2024-09-19 ENCOUNTER — APPOINTMENT (OUTPATIENT)
Dept: PULMONOLOGY | Facility: CLINIC | Age: 84
End: 2024-09-19
Payer: MEDICARE

## 2024-09-19 VITALS
HEART RATE: 65 BPM | OXYGEN SATURATION: 97 % | SYSTOLIC BLOOD PRESSURE: 138 MMHG | DIASTOLIC BLOOD PRESSURE: 60 MMHG | RESPIRATION RATE: 16 BRPM

## 2024-09-19 DIAGNOSIS — G47.33 OBSTRUCTIVE SLEEP APNEA (ADULT) (PEDIATRIC): ICD-10-CM

## 2024-09-19 DIAGNOSIS — J45.909 UNSPECIFIED ASTHMA, UNCOMPLICATED: ICD-10-CM

## 2024-09-19 DIAGNOSIS — Z79.899 OTHER LONG TERM (CURRENT) DRUG THERAPY: ICD-10-CM

## 2024-09-19 DIAGNOSIS — G47.31 PRIMARY CENTRAL SLEEP APNEA: ICD-10-CM

## 2024-09-19 PROCEDURE — 94010 BREATHING CAPACITY TEST: CPT

## 2024-09-19 PROCEDURE — 99214 OFFICE O/P EST MOD 30 MIN: CPT | Mod: 25

## 2024-09-19 NOTE — PROCEDURE
[FreeTextEntry1] : sintia done 9/19/24: no obstruction, mild restriction by ATS criteria  compliance reivewed  PFT done today 6/21/24 continue to show obstruction but this time moderate range; air trapping seen; DLCO normal.

## 2024-09-19 NOTE — PLAN
[TextEntry] : Continue use of ASVauto.  Advised to try med mask. He wants to continue with same mask.  Continue arnuity for now. PRN albutrerol with spacer.   F/U with cardio about afib and pulm HTN with possible diastolic dysfxn. Will do PFT in 2025 given he is on amio.

## 2024-09-19 NOTE — HISTORY OF PRESENT ILLNESS
[AHI: ___ per hour] : Apnea-hypopnea index:  [unfilled] per hour [Date: ___] : the most recent therapeutic polysomnogram was completed [unfilled] [ASV w/EPAP: ____ cmH2O] : ASV with EPAP: [unfilled] cmH2O [Therapy based AHI: ___ /hr] : Therapy based AHI: [unfilled] / hr [Never] : never [% Days used > 4 hrs: ____] : Days used > 4 hrs: [unfilled] % [FreeTextEntry1] : ASV auto EPAP 5-15, PS 0-20.

## 2024-10-01 ENCOUNTER — APPOINTMENT (OUTPATIENT)
Dept: CARDIOLOGY | Facility: CLINIC | Age: 84
End: 2024-10-01
Payer: MEDICARE

## 2024-10-01 PROCEDURE — 93242 EXT ECG>48HR<7D RECORDING: CPT

## 2024-10-29 ENCOUNTER — APPOINTMENT (OUTPATIENT)
Dept: CARDIOLOGY | Facility: CLINIC | Age: 84
End: 2024-10-29
Payer: MEDICARE

## 2024-10-29 ENCOUNTER — NON-APPOINTMENT (OUTPATIENT)
Age: 84
End: 2024-10-29

## 2024-10-29 VITALS
WEIGHT: 234 LBS | HEART RATE: 68 BPM | OXYGEN SATURATION: 98 % | SYSTOLIC BLOOD PRESSURE: 136 MMHG | BODY MASS INDEX: 34.66 KG/M2 | HEIGHT: 69 IN | DIASTOLIC BLOOD PRESSURE: 72 MMHG

## 2024-10-29 DIAGNOSIS — Z86.79 OTHER SPECIFIED POSTPROCEDURAL STATES: ICD-10-CM

## 2024-10-29 DIAGNOSIS — Z98.890 OTHER SPECIFIED POSTPROCEDURAL STATES: ICD-10-CM

## 2024-10-29 DIAGNOSIS — Z79.899 OTHER LONG TERM (CURRENT) DRUG THERAPY: ICD-10-CM

## 2024-10-29 DIAGNOSIS — I10 ESSENTIAL (PRIMARY) HYPERTENSION: ICD-10-CM

## 2024-10-29 DIAGNOSIS — I48.91 UNSPECIFIED ATRIAL FIBRILLATION: ICD-10-CM

## 2024-10-29 DIAGNOSIS — I35.1 NONRHEUMATIC AORTIC (VALVE) INSUFFICIENCY: ICD-10-CM

## 2024-10-29 DIAGNOSIS — Z79.01 LONG TERM (CURRENT) USE OF ANTICOAGULANTS: ICD-10-CM

## 2024-10-29 DIAGNOSIS — E78.5 HYPERLIPIDEMIA, UNSPECIFIED: ICD-10-CM

## 2024-10-29 PROCEDURE — 99214 OFFICE O/P EST MOD 30 MIN: CPT

## 2024-10-29 PROCEDURE — 93000 ELECTROCARDIOGRAM COMPLETE: CPT

## 2024-11-19 ENCOUNTER — APPOINTMENT (OUTPATIENT)
Dept: CARDIOLOGY | Facility: CLINIC | Age: 84
End: 2024-11-19
Payer: MEDICARE

## 2024-11-19 ENCOUNTER — NON-APPOINTMENT (OUTPATIENT)
Age: 84
End: 2024-11-19

## 2024-11-19 VITALS
SYSTOLIC BLOOD PRESSURE: 130 MMHG | OXYGEN SATURATION: 97 % | BODY MASS INDEX: 35.89 KG/M2 | WEIGHT: 243 LBS | DIASTOLIC BLOOD PRESSURE: 58 MMHG | HEART RATE: 73 BPM

## 2024-11-19 DIAGNOSIS — Z86.79 OTHER SPECIFIED POSTPROCEDURAL STATES: ICD-10-CM

## 2024-11-19 DIAGNOSIS — I35.1 NONRHEUMATIC AORTIC (VALVE) INSUFFICIENCY: ICD-10-CM

## 2024-11-19 DIAGNOSIS — I48.91 UNSPECIFIED ATRIAL FIBRILLATION: ICD-10-CM

## 2024-11-19 DIAGNOSIS — I34.0 NONRHEUMATIC MITRAL (VALVE) INSUFFICIENCY: ICD-10-CM

## 2024-11-19 DIAGNOSIS — I10 ESSENTIAL (PRIMARY) HYPERTENSION: ICD-10-CM

## 2024-11-19 DIAGNOSIS — I27.20 PULMONARY HYPERTENSION, UNSPECIFIED: ICD-10-CM

## 2024-11-19 DIAGNOSIS — I47.29 OTHER VENTRICULAR TACHYCARDIA: ICD-10-CM

## 2024-11-19 DIAGNOSIS — Z79.01 LONG TERM (CURRENT) USE OF ANTICOAGULANTS: ICD-10-CM

## 2024-11-19 DIAGNOSIS — R94.31 ABNORMAL ELECTROCARDIOGRAM [ECG] [EKG]: ICD-10-CM

## 2024-11-19 DIAGNOSIS — E78.5 HYPERLIPIDEMIA, UNSPECIFIED: ICD-10-CM

## 2024-11-19 DIAGNOSIS — I70.0 ATHEROSCLEROSIS OF AORTA: ICD-10-CM

## 2024-11-19 DIAGNOSIS — Z98.890 OTHER SPECIFIED POSTPROCEDURAL STATES: ICD-10-CM

## 2024-11-19 PROCEDURE — 93306 TTE W/DOPPLER COMPLETE: CPT

## 2024-11-19 PROCEDURE — 99214 OFFICE O/P EST MOD 30 MIN: CPT

## 2025-03-12 ENCOUNTER — APPOINTMENT (OUTPATIENT)
Dept: PULMONOLOGY | Facility: CLINIC | Age: 85
End: 2025-03-12
Payer: MEDICARE

## 2025-03-12 VITALS
SYSTOLIC BLOOD PRESSURE: 136 MMHG | DIASTOLIC BLOOD PRESSURE: 74 MMHG | OXYGEN SATURATION: 97 % | RESPIRATION RATE: 16 BRPM | HEART RATE: 74 BPM

## 2025-03-12 VITALS — WEIGHT: 247 LBS | HEIGHT: 69 IN | BODY MASS INDEX: 36.58 KG/M2

## 2025-03-12 DIAGNOSIS — Z79.899 OTHER LONG TERM (CURRENT) DRUG THERAPY: ICD-10-CM

## 2025-03-12 DIAGNOSIS — G47.33 OBSTRUCTIVE SLEEP APNEA (ADULT) (PEDIATRIC): ICD-10-CM

## 2025-03-12 DIAGNOSIS — J44.9 CHRONIC OBSTRUCTIVE PULMONARY DISEASE, UNSPECIFIED: ICD-10-CM

## 2025-03-12 PROCEDURE — 99215 OFFICE O/P EST HI 40 MIN: CPT

## 2025-03-12 PROCEDURE — G2211 COMPLEX E/M VISIT ADD ON: CPT

## 2025-03-14 ENCOUNTER — NON-APPOINTMENT (OUTPATIENT)
Age: 85
End: 2025-03-14

## 2025-03-14 ENCOUNTER — APPOINTMENT (OUTPATIENT)
Dept: CARDIOLOGY | Facility: CLINIC | Age: 85
End: 2025-03-14
Payer: MEDICARE

## 2025-03-14 VITALS
OXYGEN SATURATION: 96 % | DIASTOLIC BLOOD PRESSURE: 60 MMHG | HEART RATE: 64 BPM | WEIGHT: 247 LBS | SYSTOLIC BLOOD PRESSURE: 144 MMHG | BODY MASS INDEX: 36.48 KG/M2

## 2025-03-14 VITALS — DIASTOLIC BLOOD PRESSURE: 60 MMHG | SYSTOLIC BLOOD PRESSURE: 122 MMHG

## 2025-03-14 DIAGNOSIS — I34.0 NONRHEUMATIC MITRAL (VALVE) INSUFFICIENCY: ICD-10-CM

## 2025-03-14 DIAGNOSIS — I47.29 OTHER VENTRICULAR TACHYCARDIA: ICD-10-CM

## 2025-03-14 DIAGNOSIS — R06.02 SHORTNESS OF BREATH: ICD-10-CM

## 2025-03-14 DIAGNOSIS — E78.5 HYPERLIPIDEMIA, UNSPECIFIED: ICD-10-CM

## 2025-03-14 DIAGNOSIS — I35.1 NONRHEUMATIC AORTIC (VALVE) INSUFFICIENCY: ICD-10-CM

## 2025-03-14 DIAGNOSIS — I10 ESSENTIAL (PRIMARY) HYPERTENSION: ICD-10-CM

## 2025-03-14 DIAGNOSIS — R94.31 ABNORMAL ELECTROCARDIOGRAM [ECG] [EKG]: ICD-10-CM

## 2025-03-14 DIAGNOSIS — I70.0 ATHEROSCLEROSIS OF AORTA: ICD-10-CM

## 2025-03-14 DIAGNOSIS — Z79.01 LONG TERM (CURRENT) USE OF ANTICOAGULANTS: ICD-10-CM

## 2025-03-14 DIAGNOSIS — G47.33 OBSTRUCTIVE SLEEP APNEA (ADULT) (PEDIATRIC): ICD-10-CM

## 2025-03-14 DIAGNOSIS — I48.91 UNSPECIFIED ATRIAL FIBRILLATION: ICD-10-CM

## 2025-03-14 PROCEDURE — 99214 OFFICE O/P EST MOD 30 MIN: CPT

## 2025-03-14 PROCEDURE — 93000 ELECTROCARDIOGRAM COMPLETE: CPT

## 2025-03-14 RX ORDER — LOSARTAN POTASSIUM AND HYDROCHLOROTHIAZIDE 12.5; 1 MG/1; MG/1
100-12.5 TABLET ORAL DAILY
Refills: 0 | Status: ACTIVE | COMMUNITY

## 2025-03-18 ENCOUNTER — APPOINTMENT (OUTPATIENT)
Dept: RADIOLOGY | Facility: CLINIC | Age: 85
End: 2025-03-18
Payer: MEDICARE

## 2025-03-18 DIAGNOSIS — J15.9 UNSPECIFIED BACTERIAL PNEUMONIA: ICD-10-CM

## 2025-03-18 PROCEDURE — 71046 X-RAY EXAM CHEST 2 VIEWS: CPT

## 2025-03-18 RX ORDER — DOXYCYCLINE 100 MG/1
100 TABLET, FILM COATED ORAL
Qty: 10 | Refills: 0 | Status: ACTIVE | COMMUNITY
Start: 2025-03-18 | End: 1900-01-01

## 2025-03-21 ENCOUNTER — APPOINTMENT (OUTPATIENT)
Dept: PULMONOLOGY | Facility: CLINIC | Age: 85
End: 2025-03-21
Payer: MEDICARE

## 2025-03-21 VITALS — WEIGHT: 247 LBS | BODY MASS INDEX: 35.36 KG/M2 | HEIGHT: 70 IN

## 2025-03-21 DIAGNOSIS — I27.20 PULMONARY HYPERTENSION, UNSPECIFIED: ICD-10-CM

## 2025-03-21 PROCEDURE — 94727 GAS DIL/WSHOT DETER LNG VOL: CPT

## 2025-03-21 PROCEDURE — 85018 HEMOGLOBIN: CPT | Mod: QW

## 2025-03-21 PROCEDURE — 94729 DIFFUSING CAPACITY: CPT

## 2025-03-21 PROCEDURE — 94010 BREATHING CAPACITY TEST: CPT

## 2025-03-27 DIAGNOSIS — R91.8 OTHER NONSPECIFIC ABNORMAL FINDING OF LUNG FIELD: ICD-10-CM

## 2025-03-28 ENCOUNTER — APPOINTMENT (OUTPATIENT)
Dept: CARDIOLOGY | Facility: CLINIC | Age: 85
End: 2025-03-28
Payer: MEDICARE

## 2025-03-28 PROCEDURE — 93015 CV STRESS TEST SUPVJ I&R: CPT

## 2025-03-28 PROCEDURE — 78452 HT MUSCLE IMAGE SPECT MULT: CPT

## 2025-03-28 PROCEDURE — A9502: CPT | Mod: JZ

## 2025-05-06 ENCOUNTER — APPOINTMENT (OUTPATIENT)
Dept: CARDIOLOGY | Facility: CLINIC | Age: 85
End: 2025-05-06
Payer: MEDICARE

## 2025-05-06 VITALS
DIASTOLIC BLOOD PRESSURE: 70 MMHG | HEIGHT: 70 IN | WEIGHT: 249 LBS | OXYGEN SATURATION: 96 % | HEART RATE: 69 BPM | SYSTOLIC BLOOD PRESSURE: 152 MMHG | BODY MASS INDEX: 35.65 KG/M2

## 2025-05-06 DIAGNOSIS — I27.20 PULMONARY HYPERTENSION, UNSPECIFIED: ICD-10-CM

## 2025-05-06 DIAGNOSIS — R94.31 ABNORMAL ELECTROCARDIOGRAM [ECG] [EKG]: ICD-10-CM

## 2025-05-06 DIAGNOSIS — Z79.899 OTHER LONG TERM (CURRENT) DRUG THERAPY: ICD-10-CM

## 2025-05-06 DIAGNOSIS — Z79.01 LONG TERM (CURRENT) USE OF ANTICOAGULANTS: ICD-10-CM

## 2025-05-06 DIAGNOSIS — I48.91 UNSPECIFIED ATRIAL FIBRILLATION: ICD-10-CM

## 2025-05-06 DIAGNOSIS — I10 ESSENTIAL (PRIMARY) HYPERTENSION: ICD-10-CM

## 2025-05-06 DIAGNOSIS — R06.02 SHORTNESS OF BREATH: ICD-10-CM

## 2025-05-06 PROCEDURE — 99214 OFFICE O/P EST MOD 30 MIN: CPT

## 2025-05-13 LAB
ANION GAP SERPL CALC-SCNC: 13 MMOL/L
BUN SERPL-MCNC: 18 MG/DL
CALCIUM SERPL-MCNC: 9.5 MG/DL
CHLORIDE SERPL-SCNC: 105 MMOL/L
CHOLEST SERPL-MCNC: 135 MG/DL
CO2 SERPL-SCNC: 25 MMOL/L
CREAT SERPL-MCNC: 1.29 MG/DL
EGFRCR SERPLBLD CKD-EPI 2021: 54 ML/MIN/1.73M2
GLUCOSE SERPL-MCNC: 109 MG/DL
HDLC SERPL-MCNC: 45 MG/DL
LDLC SERPL-MCNC: 71 MG/DL
NONHDLC SERPL-MCNC: 91 MG/DL
POTASSIUM SERPL-SCNC: 4.1 MMOL/L
SODIUM SERPL-SCNC: 143 MMOL/L
TRIGL SERPL-MCNC: 106 MG/DL

## 2025-05-14 ENCOUNTER — APPOINTMENT (OUTPATIENT)
Dept: ULTRASOUND IMAGING | Facility: CLINIC | Age: 85
End: 2025-05-14

## 2025-05-14 LAB
BASOPHILS # BLD AUTO: 0.01 K/UL
BASOPHILS NFR BLD AUTO: 0.3 %
EOSINOPHIL # BLD AUTO: 0.06 K/UL
EOSINOPHIL NFR BLD AUTO: 1.8 %
HCT VFR BLD CALC: 43.1 %
HGB BLD-MCNC: 13.8 G/DL
IMM GRANULOCYTES NFR BLD AUTO: 0.9 %
LYMPHOCYTES # BLD AUTO: 0.72 K/UL
LYMPHOCYTES NFR BLD AUTO: 21.5 %
MAN DIFF?: NORMAL
MCHC RBC-ENTMCNC: 30.5 PG
MCHC RBC-ENTMCNC: 32 G/DL
MCV RBC AUTO: 95.1 FL
MONOCYTES # BLD AUTO: 0.29 K/UL
MONOCYTES NFR BLD AUTO: 8.7 %
NEUTROPHILS # BLD AUTO: 2.24 K/UL
NEUTROPHILS NFR BLD AUTO: 66.8 %
PLATELET # BLD AUTO: 118 K/UL
RBC # BLD: 4.53 M/UL
RBC # FLD: 13.5 %
WBC # FLD AUTO: 3.35 K/UL

## 2025-05-14 PROCEDURE — 93971 EXTREMITY STUDY: CPT | Mod: RT

## 2025-05-20 ENCOUNTER — APPOINTMENT (OUTPATIENT)
Dept: RADIOLOGY | Facility: CLINIC | Age: 85
End: 2025-05-20
Payer: MEDICARE

## 2025-05-20 PROCEDURE — 71046 X-RAY EXAM CHEST 2 VIEWS: CPT

## 2025-05-22 LAB
ANION GAP SERPL CALC-SCNC: 16 MMOL/L
BUN SERPL-MCNC: 18 MG/DL
CALCIUM SERPL-MCNC: 9.1 MG/DL
CHLORIDE SERPL-SCNC: 105 MMOL/L
CO2 SERPL-SCNC: 23 MMOL/L
CREAT SERPL-MCNC: 1.24 MG/DL
EGFRCR SERPLBLD CKD-EPI 2021: 57 ML/MIN/1.73M2
GLUCOSE SERPL-MCNC: 103 MG/DL
HCT VFR BLD CALC: 42.9 %
HGB BLD-MCNC: 13.8 G/DL
INR PPP: 1.14 RATIO
MCHC RBC-ENTMCNC: 30.5 PG
MCHC RBC-ENTMCNC: 32.2 G/DL
MCV RBC AUTO: 94.7 FL
PLATELET # BLD AUTO: 114 K/UL
POTASSIUM SERPL-SCNC: 4 MMOL/L
PT BLD: 13.4 SEC
RBC # BLD: 4.53 M/UL
RBC # FLD: 13.2 %
SODIUM SERPL-SCNC: 144 MMOL/L
WBC # FLD AUTO: 3.15 K/UL

## 2025-05-27 ENCOUNTER — NON-APPOINTMENT (OUTPATIENT)
Age: 85
End: 2025-05-27

## 2025-06-02 ENCOUNTER — NON-APPOINTMENT (OUTPATIENT)
Age: 85
End: 2025-06-02

## 2025-06-02 ENCOUNTER — APPOINTMENT (OUTPATIENT)
Dept: CARDIOLOGY | Facility: CLINIC | Age: 85
End: 2025-06-02
Payer: MEDICARE

## 2025-06-02 VITALS
HEIGHT: 70 IN | HEART RATE: 72 BPM | OXYGEN SATURATION: 95 % | BODY MASS INDEX: 35.5 KG/M2 | WEIGHT: 248 LBS | DIASTOLIC BLOOD PRESSURE: 78 MMHG | SYSTOLIC BLOOD PRESSURE: 142 MMHG

## 2025-06-02 DIAGNOSIS — I10 ESSENTIAL (PRIMARY) HYPERTENSION: ICD-10-CM

## 2025-06-02 DIAGNOSIS — I47.29 OTHER VENTRICULAR TACHYCARDIA: ICD-10-CM

## 2025-06-02 DIAGNOSIS — E78.5 HYPERLIPIDEMIA, UNSPECIFIED: ICD-10-CM

## 2025-06-02 DIAGNOSIS — Z98.890 OTHER SPECIFIED POSTPROCEDURAL STATES: ICD-10-CM

## 2025-06-02 DIAGNOSIS — I48.91 UNSPECIFIED ATRIAL FIBRILLATION: ICD-10-CM

## 2025-06-02 DIAGNOSIS — R06.02 SHORTNESS OF BREATH: ICD-10-CM

## 2025-06-02 PROCEDURE — 93000 ELECTROCARDIOGRAM COMPLETE: CPT

## 2025-06-02 PROCEDURE — 99214 OFFICE O/P EST MOD 30 MIN: CPT

## 2025-06-16 ENCOUNTER — APPOINTMENT (OUTPATIENT)
Dept: ELECTROPHYSIOLOGY | Facility: CLINIC | Age: 85
End: 2025-06-16
Payer: MEDICARE

## 2025-06-16 VITALS — OXYGEN SATURATION: 97 % | DIASTOLIC BLOOD PRESSURE: 76 MMHG | SYSTOLIC BLOOD PRESSURE: 179 MMHG | HEART RATE: 75 BPM

## 2025-06-16 PROCEDURE — 93000 ELECTROCARDIOGRAM COMPLETE: CPT

## 2025-06-16 PROCEDURE — 99214 OFFICE O/P EST MOD 30 MIN: CPT

## 2025-08-25 ENCOUNTER — APPOINTMENT (OUTPATIENT)
Dept: CARDIOLOGY | Facility: CLINIC | Age: 85
End: 2025-08-25
Payer: MEDICARE

## 2025-08-25 VITALS
HEART RATE: 87 BPM | SYSTOLIC BLOOD PRESSURE: 136 MMHG | OXYGEN SATURATION: 98 % | BODY MASS INDEX: 34.87 KG/M2 | WEIGHT: 243 LBS | DIASTOLIC BLOOD PRESSURE: 72 MMHG

## 2025-08-25 DIAGNOSIS — I48.91 UNSPECIFIED ATRIAL FIBRILLATION: ICD-10-CM

## 2025-08-25 DIAGNOSIS — I10 ESSENTIAL (PRIMARY) HYPERTENSION: ICD-10-CM

## 2025-08-25 DIAGNOSIS — R06.02 SHORTNESS OF BREATH: ICD-10-CM

## 2025-08-25 DIAGNOSIS — Z79.01 LONG TERM (CURRENT) USE OF ANTICOAGULANTS: ICD-10-CM

## 2025-08-25 DIAGNOSIS — I27.20 PULMONARY HYPERTENSION, UNSPECIFIED: ICD-10-CM

## 2025-08-25 DIAGNOSIS — E78.5 HYPERLIPIDEMIA, UNSPECIFIED: ICD-10-CM

## 2025-08-25 PROCEDURE — 99214 OFFICE O/P EST MOD 30 MIN: CPT

## 2025-08-25 PROCEDURE — 93000 ELECTROCARDIOGRAM COMPLETE: CPT

## 2025-09-11 ENCOUNTER — APPOINTMENT (OUTPATIENT)
Dept: PULMONOLOGY | Facility: CLINIC | Age: 85
End: 2025-09-11
Payer: MEDICARE

## 2025-09-11 VITALS
RESPIRATION RATE: 16 BRPM | HEIGHT: 70 IN | HEART RATE: 78 BPM | DIASTOLIC BLOOD PRESSURE: 70 MMHG | BODY MASS INDEX: 34.36 KG/M2 | OXYGEN SATURATION: 96 % | SYSTOLIC BLOOD PRESSURE: 136 MMHG | WEIGHT: 240 LBS

## 2025-09-11 DIAGNOSIS — R06.02 SHORTNESS OF BREATH: ICD-10-CM

## 2025-09-11 DIAGNOSIS — J45.909 UNSPECIFIED ASTHMA, UNCOMPLICATED: ICD-10-CM

## 2025-09-11 DIAGNOSIS — I27.20 PULMONARY HYPERTENSION, UNSPECIFIED: ICD-10-CM

## 2025-09-11 DIAGNOSIS — G47.33 OBSTRUCTIVE SLEEP APNEA (ADULT) (PEDIATRIC): ICD-10-CM

## 2025-09-11 PROCEDURE — 99214 OFFICE O/P EST MOD 30 MIN: CPT

## 2025-09-11 PROCEDURE — G2211 COMPLEX E/M VISIT ADD ON: CPT

## 2025-09-11 RX ORDER — ALBUTEROL SULFATE 90 UG/1
108 (90 BASE) INHALANT RESPIRATORY (INHALATION)
Qty: 1 | Refills: 5 | Status: ACTIVE | COMMUNITY
Start: 2025-09-11 | End: 1900-01-01